# Patient Record
Sex: MALE | Race: ASIAN | NOT HISPANIC OR LATINO | Employment: UNEMPLOYED | URBAN - METROPOLITAN AREA
[De-identification: names, ages, dates, MRNs, and addresses within clinical notes are randomized per-mention and may not be internally consistent; named-entity substitution may affect disease eponyms.]

---

## 2019-08-07 ENCOUNTER — TRANSCRIBE ORDERS (OUTPATIENT)
Dept: ADMINISTRATIVE | Facility: HOSPITAL | Age: 59
End: 2019-08-07

## 2019-08-07 DIAGNOSIS — R29.898 COMPLAINTS OF WEAKNESS OF LOWER EXTREMITY: Primary | ICD-10-CM

## 2019-09-18 ENCOUNTER — HOSPITAL ENCOUNTER (OUTPATIENT)
Dept: INFUSION THERAPY | Facility: HOSPITAL | Age: 59
Discharge: HOME | End: 2019-09-18
Admitting: PSYCHIATRY & NEUROLOGY

## 2019-09-18 DIAGNOSIS — R29.898 COMPLAINTS OF WEAKNESS OF LOWER EXTREMITY: ICD-10-CM

## 2019-09-18 PROCEDURE — 95886 MUSC TEST DONE W/N TEST COMP: CPT

## 2019-09-18 PROCEDURE — 95909 NRV CNDJ TST 5-6 STUDIES: CPT

## 2019-09-18 PROCEDURE — 95909 NRV CNDJ TST 5-6 STUDIES: CPT | Performed by: PSYCHIATRY & NEUROLOGY

## 2019-09-18 PROCEDURE — 95886 MUSC TEST DONE W/N TEST COMP: CPT | Performed by: PSYCHIATRY & NEUROLOGY

## 2019-09-18 NOTE — PROCEDURES
EMG and Nerve Conduction Studies    I.      Instrument used: Neuromax 1002  II.     Please see data sheets for tabular summary of NCS and details on methods, temperatures and lab standards.   III.    EMG muscles tested for upper extremity studies include the deltoid, biceps, triceps, pronator teres, extensor digitorum communis, first dorsal interosseous and abductor pollicis brevis.    IV.   EMG muscles tested for lower extremity studies include the vastus lateralis, tibialis anterior, peroneus longus, medial gastrocnemius and extensor digitorum brevis.    V.    Additional muscles tested as needed.  Paraspinal muscles tested as needed.   VI.   Please see data sheets for tabular summary of EMG findings.   VII. The complete report includes the data sheets.      Indication: Numbness tingling burning in the feet  History: 59-year-old male from [facility] who has a 10-year history of diabetes and approximate 1 year history of numbness tingling and burning in the feet which has progressively escalated of the foot into the lower leg.  No back pain.  The patient speaks no English and communication was excellent through an  who was present throughout the entire exam.      Ht: Not reported  Wt: Not reported  HbA1C: No results found for: HGBA1C  TSH: No results found for: TSH    Technical summary:  Nerve conduction studies were obtained in the left leg with 1 comparison on the right.  Skin temperatures were at least 32 °C measured at the ankles.  Needle examination was obtained on selected muscles in both legs.    Results:  1.  Prolonged left sural sensory latency at 4.2 ms with low amplitude of 4.3 µV.  2.  Normal left superficial peroneal sensory latency with low amplitude of 2.7 µV.  Prolonged right superficial peroneal sensory latency at 4.8 ms with low amplitude of 3.0 µV.  3.  Slow left peroneal motor velocity below the knee at 34.3 m/s with normal velocity in the short segment across the fibular head.  Normal  distal latency and amplitudes.  4.  Slow left tibial motor velocity at 37.5 m/s with a normal distal latency and amplitudes.  5.  Needle examination of selected muscles in both legs showed normal insertional activities throughout.  There was a mild increased number of large motor units in both extensor digitorum brevis muscles with normal firing rates and recruitment.  All other muscles tested in both legs showed normal insertional activities, motor units and recruitment patterns.    Impression:  Abnormal study showing mild peripheral neuropathy.  No additional findings to suggest a lumbosacral radiculopathy on either side by this study.  Needle exam changes were consistent with the nerve conduction findings.  Study results were discussed with the patient through the patient's .    Roberto Carlos Ledezma M.D.              Dictated utilizing Dragon dictation.

## 2021-06-10 ENCOUNTER — OFFICE (OUTPATIENT)
Dept: URBAN - METROPOLITAN AREA CLINIC 75 | Facility: CLINIC | Age: 61
End: 2021-06-10
Payer: MEDICAID

## 2021-06-10 VITALS
DIASTOLIC BLOOD PRESSURE: 80 MMHG | WEIGHT: 159 LBS | OXYGEN SATURATION: 99 % | SYSTOLIC BLOOD PRESSURE: 120 MMHG | HEIGHT: 68 IN | HEART RATE: 67 BPM

## 2021-06-10 DIAGNOSIS — R10.12 LEFT UPPER QUADRANT PAIN: ICD-10-CM

## 2021-06-10 DIAGNOSIS — R10.13 EPIGASTRIC PAIN: ICD-10-CM

## 2021-06-10 PROCEDURE — 99204 OFFICE O/P NEW MOD 45 MIN: CPT | Performed by: INTERNAL MEDICINE

## 2021-06-10 RX ORDER — OMEPRAZOLE 40 MG/1
40 CAPSULE, DELAYED RELEASE ORAL
Qty: 90 | Refills: 3 | Status: ACTIVE
Start: 2021-06-10

## 2021-07-06 ENCOUNTER — OFFICE (OUTPATIENT)
Dept: URBAN - METROPOLITAN AREA CLINIC 75 | Facility: CLINIC | Age: 61
End: 2021-07-06
Payer: MEDICAID

## 2021-07-06 VITALS — WEIGHT: 158 LBS | HEIGHT: 68 IN | DIASTOLIC BLOOD PRESSURE: 83 MMHG | SYSTOLIC BLOOD PRESSURE: 119 MMHG

## 2021-07-06 DIAGNOSIS — R10.12 LEFT UPPER QUADRANT PAIN: ICD-10-CM

## 2021-07-06 PROCEDURE — 99214 OFFICE O/P EST MOD 30 MIN: CPT | Performed by: NURSE PRACTITIONER

## 2021-08-04 ENCOUNTER — ON CAMPUS - OUTPATIENT (OUTPATIENT)
Dept: URBAN - METROPOLITAN AREA HOSPITAL 108 | Facility: HOSPITAL | Age: 61
End: 2021-08-04
Payer: MEDICAID

## 2021-08-04 DIAGNOSIS — K31.89 OTHER DISEASES OF STOMACH AND DUODENUM: ICD-10-CM

## 2021-08-04 DIAGNOSIS — B96.81 HELICOBACTER PYLORI [H. PYLORI] AS THE CAUSE OF DISEASES CLA: ICD-10-CM

## 2021-08-04 DIAGNOSIS — R10.12 LEFT UPPER QUADRANT PAIN: ICD-10-CM

## 2021-08-04 DIAGNOSIS — K29.50 UNSPECIFIED CHRONIC GASTRITIS WITHOUT BLEEDING: ICD-10-CM

## 2021-08-04 PROCEDURE — 43239 EGD BIOPSY SINGLE/MULTIPLE: CPT | Performed by: INTERNAL MEDICINE

## 2022-03-26 ENCOUNTER — APPOINTMENT (OUTPATIENT)
Dept: GENERAL RADIOLOGY | Facility: HOSPITAL | Age: 62
End: 2022-03-26

## 2022-03-26 ENCOUNTER — HOSPITAL ENCOUNTER (INPATIENT)
Facility: HOSPITAL | Age: 62
LOS: 8 days | Discharge: HOME | End: 2022-04-03
Attending: EMERGENCY MEDICINE | Admitting: STUDENT IN AN ORGANIZED HEALTH CARE EDUCATION/TRAINING PROGRAM

## 2022-03-26 DIAGNOSIS — E11.69 TYPE 2 DIABETES MELLITUS WITH OTHER SPECIFIED COMPLICATION, WITHOUT LONG-TERM CURRENT USE OF INSULIN: ICD-10-CM

## 2022-03-26 DIAGNOSIS — J18.9 COMMUNITY ACQUIRED PNEUMONIA, UNSPECIFIED LATERALITY: Primary | ICD-10-CM

## 2022-03-26 PROBLEM — E11.9 DM (DIABETES MELLITUS), TYPE 2 (HCC): Status: ACTIVE | Noted: 2022-03-26

## 2022-03-26 PROBLEM — D69.6 THROMBOCYTOPENIA (HCC): Status: ACTIVE | Noted: 2022-03-26

## 2022-03-26 PROBLEM — K21.9 GERD WITHOUT ESOPHAGITIS: Status: ACTIVE | Noted: 2022-03-26

## 2022-03-26 PROBLEM — I10 HTN (HYPERTENSION): Status: ACTIVE | Noted: 2022-03-26

## 2022-03-26 LAB
ALBUMIN SERPL-MCNC: 3.6 G/DL (ref 3.5–5.2)
ALBUMIN/GLOB SERPL: 1.2 G/DL
ALP SERPL-CCNC: 121 U/L (ref 39–117)
ALT SERPL W P-5'-P-CCNC: 22 U/L (ref 1–41)
ANION GAP SERPL CALCULATED.3IONS-SCNC: 14.8 MMOL/L (ref 5–15)
AST SERPL-CCNC: 18 U/L (ref 1–40)
B PARAPERT DNA SPEC QL NAA+PROBE: NOT DETECTED
B PERT DNA SPEC QL NAA+PROBE: NOT DETECTED
BASOPHILS # BLD AUTO: 0.03 10*3/MM3 (ref 0–0.2)
BASOPHILS NFR BLD AUTO: 0.2 % (ref 0–1.5)
BILIRUB SERPL-MCNC: 1.1 MG/DL (ref 0–1.2)
BUN SERPL-MCNC: 13 MG/DL (ref 8–23)
BUN/CREAT SERPL: 14 (ref 7–25)
C PNEUM DNA NPH QL NAA+NON-PROBE: NOT DETECTED
CALCIUM SPEC-SCNC: 8.3 MG/DL (ref 8.6–10.5)
CHLORIDE SERPL-SCNC: 105 MMOL/L (ref 98–107)
CO2 SERPL-SCNC: 18.2 MMOL/L (ref 22–29)
CREAT SERPL-MCNC: 0.93 MG/DL (ref 0.76–1.27)
D-LACTATE SERPL-SCNC: 0.9 MMOL/L (ref 0.5–2)
DEPRECATED RDW RBC AUTO: 42.2 FL (ref 37–54)
EGFRCR SERPLBLD CKD-EPI 2021: 93.4 ML/MIN/1.73
EOSINOPHIL # BLD AUTO: 0.01 10*3/MM3 (ref 0–0.4)
EOSINOPHIL NFR BLD AUTO: 0.1 % (ref 0.3–6.2)
ERYTHROCYTE [DISTWIDTH] IN BLOOD BY AUTOMATED COUNT: 13.1 % (ref 12.3–15.4)
FLUAV SUBTYP SPEC NAA+PROBE: NOT DETECTED
FLUBV RNA ISLT QL NAA+PROBE: NOT DETECTED
GLOBULIN UR ELPH-MCNC: 2.9 GM/DL
GLUCOSE BLDC GLUCOMTR-MCNC: 180 MG/DL (ref 70–130)
GLUCOSE BLDC GLUCOMTR-MCNC: 184 MG/DL (ref 70–130)
GLUCOSE BLDC GLUCOMTR-MCNC: 255 MG/DL (ref 70–130)
GLUCOSE SERPL-MCNC: 148 MG/DL (ref 65–99)
HADV DNA SPEC NAA+PROBE: NOT DETECTED
HBA1C MFR BLD: 8.8 % (ref 4.8–5.6)
HCOV 229E RNA SPEC QL NAA+PROBE: NOT DETECTED
HCOV HKU1 RNA SPEC QL NAA+PROBE: NOT DETECTED
HCOV NL63 RNA SPEC QL NAA+PROBE: NOT DETECTED
HCOV OC43 RNA SPEC QL NAA+PROBE: NOT DETECTED
HCT VFR BLD AUTO: 41.2 % (ref 37.5–51)
HGB BLD-MCNC: 13.5 G/DL (ref 13–17.7)
HMPV RNA NPH QL NAA+NON-PROBE: NOT DETECTED
HPIV1 RNA ISLT QL NAA+PROBE: NOT DETECTED
HPIV2 RNA SPEC QL NAA+PROBE: NOT DETECTED
HPIV3 RNA NPH QL NAA+PROBE: NOT DETECTED
HPIV4 P GENE NPH QL NAA+PROBE: NOT DETECTED
IMM GRANULOCYTES # BLD AUTO: 0.38 10*3/MM3 (ref 0–0.05)
IMM GRANULOCYTES NFR BLD AUTO: 2.2 % (ref 0–0.5)
L PNEUMO1 AG UR QL IA: NEGATIVE
LYMPHOCYTES # BLD AUTO: 0.75 10*3/MM3 (ref 0.7–3.1)
LYMPHOCYTES NFR BLD AUTO: 4.2 % (ref 19.6–45.3)
M PNEUMO IGG SER IA-ACNC: NOT DETECTED
MCH RBC QN AUTO: 28.7 PG (ref 26.6–33)
MCHC RBC AUTO-ENTMCNC: 32.8 G/DL (ref 31.5–35.7)
MCV RBC AUTO: 87.5 FL (ref 79–97)
MONOCYTES # BLD AUTO: 1.54 10*3/MM3 (ref 0.1–0.9)
MONOCYTES NFR BLD AUTO: 8.7 % (ref 5–12)
MRSA DNA SPEC QL NAA+PROBE: NORMAL
NEUTROPHILS NFR BLD AUTO: 14.94 10*3/MM3 (ref 1.7–7)
NEUTROPHILS NFR BLD AUTO: 84.6 % (ref 42.7–76)
NRBC BLD AUTO-RTO: 0 /100 WBC (ref 0–0.2)
PLATELET # BLD AUTO: 129 10*3/MM3 (ref 140–450)
PMV BLD AUTO: 10.7 FL (ref 6–12)
POTASSIUM SERPL-SCNC: 3.5 MMOL/L (ref 3.5–5.2)
PROCALCITONIN SERPL-MCNC: 0.87 NG/ML (ref 0–0.25)
PROT SERPL-MCNC: 6.5 G/DL (ref 6–8.5)
QT INTERVAL: 343 MS
RBC # BLD AUTO: 4.71 10*6/MM3 (ref 4.14–5.8)
RHINOVIRUS RNA SPEC NAA+PROBE: NOT DETECTED
RSV RNA NPH QL NAA+NON-PROBE: NOT DETECTED
S PNEUM AG SPEC QL LA: NEGATIVE
SARS-COV-2 ORF1AB RESP QL NAA+PROBE: NOT DETECTED
SARS-COV-2 RNA NPH QL NAA+NON-PROBE: NOT DETECTED
SODIUM SERPL-SCNC: 138 MMOL/L (ref 136–145)
WBC NRBC COR # BLD: 17.65 10*3/MM3 (ref 3.4–10.8)

## 2022-03-26 PROCEDURE — 83605 ASSAY OF LACTIC ACID: CPT | Performed by: PHYSICIAN ASSISTANT

## 2022-03-26 PROCEDURE — 0202U NFCT DS 22 TRGT SARS-COV-2: CPT | Performed by: STUDENT IN AN ORGANIZED HEALTH CARE EDUCATION/TRAINING PROGRAM

## 2022-03-26 PROCEDURE — 87641 MR-STAPH DNA AMP PROBE: CPT | Performed by: NURSE PRACTITIONER

## 2022-03-26 PROCEDURE — 99285 EMERGENCY DEPT VISIT HI MDM: CPT

## 2022-03-26 PROCEDURE — 84145 PROCALCITONIN (PCT): CPT | Performed by: PHYSICIAN ASSISTANT

## 2022-03-26 PROCEDURE — 87899 AGENT NOS ASSAY W/OPTIC: CPT | Performed by: NURSE PRACTITIONER

## 2022-03-26 PROCEDURE — 82962 GLUCOSE BLOOD TEST: CPT

## 2022-03-26 PROCEDURE — 94799 UNLISTED PULMONARY SVC/PX: CPT

## 2022-03-26 PROCEDURE — 25010000002 AZITHROMYCIN PER 500 MG: Performed by: PHYSICIAN ASSISTANT

## 2022-03-26 PROCEDURE — 80053 COMPREHEN METABOLIC PANEL: CPT | Performed by: PHYSICIAN ASSISTANT

## 2022-03-26 PROCEDURE — 94640 AIRWAY INHALATION TREATMENT: CPT

## 2022-03-26 PROCEDURE — 93005 ELECTROCARDIOGRAM TRACING: CPT

## 2022-03-26 PROCEDURE — 85025 COMPLETE CBC W/AUTO DIFF WBC: CPT | Performed by: PHYSICIAN ASSISTANT

## 2022-03-26 PROCEDURE — 71045 X-RAY EXAM CHEST 1 VIEW: CPT

## 2022-03-26 PROCEDURE — 93005 ELECTROCARDIOGRAM TRACING: CPT | Performed by: EMERGENCY MEDICINE

## 2022-03-26 PROCEDURE — 87040 BLOOD CULTURE FOR BACTERIA: CPT | Performed by: PHYSICIAN ASSISTANT

## 2022-03-26 PROCEDURE — 83036 HEMOGLOBIN GLYCOSYLATED A1C: CPT | Performed by: NURSE PRACTITIONER

## 2022-03-26 PROCEDURE — 25010000002 CEFTRIAXONE PER 250 MG: Performed by: PHYSICIAN ASSISTANT

## 2022-03-26 PROCEDURE — U0004 COV-19 TEST NON-CDC HGH THRU: HCPCS | Performed by: STUDENT IN AN ORGANIZED HEALTH CARE EDUCATION/TRAINING PROGRAM

## 2022-03-26 PROCEDURE — 93010 ELECTROCARDIOGRAM REPORT: CPT | Performed by: INTERNAL MEDICINE

## 2022-03-26 RX ORDER — ONDANSETRON 2 MG/ML
4 INJECTION INTRAMUSCULAR; INTRAVENOUS EVERY 6 HOURS PRN
Status: DISCONTINUED | OUTPATIENT
Start: 2022-03-26 | End: 2022-04-03 | Stop reason: HOSPADM

## 2022-03-26 RX ORDER — CETIRIZINE HYDROCHLORIDE 10 MG/1
10 TABLET ORAL DAILY
Status: DISCONTINUED | OUTPATIENT
Start: 2022-03-26 | End: 2022-04-03 | Stop reason: HOSPADM

## 2022-03-26 RX ORDER — BENZONATATE 100 MG/1
100 CAPSULE ORAL 3 TIMES DAILY PRN
Status: DISCONTINUED | OUTPATIENT
Start: 2022-03-26 | End: 2022-04-03 | Stop reason: HOSPADM

## 2022-03-26 RX ORDER — NITROGLYCERIN 0.4 MG/1
0.4 TABLET SUBLINGUAL
Status: DISCONTINUED | OUTPATIENT
Start: 2022-03-26 | End: 2022-04-03 | Stop reason: HOSPADM

## 2022-03-26 RX ORDER — GLIPIZIDE 10 MG/1
10 TABLET ORAL
COMMUNITY

## 2022-03-26 RX ORDER — IPRATROPIUM BROMIDE AND ALBUTEROL SULFATE 2.5; .5 MG/3ML; MG/3ML
3 SOLUTION RESPIRATORY (INHALATION)
Status: DISCONTINUED | OUTPATIENT
Start: 2022-03-26 | End: 2022-04-03

## 2022-03-26 RX ORDER — ACETAMINOPHEN 500 MG
1000 TABLET ORAL ONCE
Status: COMPLETED | OUTPATIENT
Start: 2022-03-26 | End: 2022-03-26

## 2022-03-26 RX ORDER — ACETAMINOPHEN 160 MG/5ML
650 SOLUTION ORAL EVERY 4 HOURS PRN
Status: DISCONTINUED | OUTPATIENT
Start: 2022-03-26 | End: 2022-04-03 | Stop reason: HOSPADM

## 2022-03-26 RX ORDER — SODIUM CHLORIDE 9 MG/ML
150 INJECTION, SOLUTION INTRAVENOUS CONTINUOUS
Status: DISCONTINUED | OUTPATIENT
Start: 2022-03-26 | End: 2022-03-28

## 2022-03-26 RX ORDER — ONDANSETRON 4 MG/1
4 TABLET, FILM COATED ORAL EVERY 6 HOURS PRN
Status: DISCONTINUED | OUTPATIENT
Start: 2022-03-26 | End: 2022-04-03 | Stop reason: HOSPADM

## 2022-03-26 RX ORDER — LEVOFLOXACIN 750 MG/1
750 TABLET ORAL DAILY
COMMUNITY
End: 2022-04-03 | Stop reason: HOSPADM

## 2022-03-26 RX ORDER — ACETAMINOPHEN 325 MG/1
650 TABLET ORAL EVERY 6 HOURS PRN
COMMUNITY

## 2022-03-26 RX ORDER — DEXTROSE MONOHYDRATE 25 G/50ML
25 INJECTION, SOLUTION INTRAVENOUS
Status: DISCONTINUED | OUTPATIENT
Start: 2022-03-26 | End: 2022-04-03 | Stop reason: HOSPADM

## 2022-03-26 RX ORDER — PREGABALIN 100 MG/1
300 CAPSULE ORAL 2 TIMES DAILY
Status: DISCONTINUED | OUTPATIENT
Start: 2022-03-26 | End: 2022-04-03 | Stop reason: HOSPADM

## 2022-03-26 RX ORDER — NICOTINE POLACRILEX 4 MG
15 LOZENGE BUCCAL
Status: DISCONTINUED | OUTPATIENT
Start: 2022-03-26 | End: 2022-04-03 | Stop reason: HOSPADM

## 2022-03-26 RX ORDER — ACETAMINOPHEN 650 MG/1
650 SUPPOSITORY RECTAL EVERY 4 HOURS PRN
Status: DISCONTINUED | OUTPATIENT
Start: 2022-03-26 | End: 2022-04-03 | Stop reason: HOSPADM

## 2022-03-26 RX ORDER — BENZONATATE 100 MG/1
100 CAPSULE ORAL 3 TIMES DAILY PRN
Status: ON HOLD | COMMUNITY
End: 2022-04-03 | Stop reason: SDUPTHER

## 2022-03-26 RX ORDER — SODIUM CHLORIDE 0.9 % (FLUSH) 0.9 %
10 SYRINGE (ML) INJECTION AS NEEDED
Status: DISCONTINUED | OUTPATIENT
Start: 2022-03-26 | End: 2022-04-03 | Stop reason: HOSPADM

## 2022-03-26 RX ORDER — INSULIN LISPRO 100 [IU]/ML
0-9 INJECTION, SOLUTION INTRAVENOUS; SUBCUTANEOUS
Status: DISCONTINUED | OUTPATIENT
Start: 2022-03-26 | End: 2022-04-01

## 2022-03-26 RX ORDER — PREGABALIN 100 MG/1
300 CAPSULE ORAL 2 TIMES DAILY
COMMUNITY

## 2022-03-26 RX ORDER — ACETAMINOPHEN 325 MG/1
650 TABLET ORAL EVERY 4 HOURS PRN
Status: DISCONTINUED | OUTPATIENT
Start: 2022-03-26 | End: 2022-04-03 | Stop reason: HOSPADM

## 2022-03-26 RX ORDER — SODIUM CHLORIDE 0.9 % (FLUSH) 0.9 %
10 SYRINGE (ML) INJECTION EVERY 12 HOURS SCHEDULED
Status: DISCONTINUED | OUTPATIENT
Start: 2022-03-26 | End: 2022-04-03 | Stop reason: HOSPADM

## 2022-03-26 RX ORDER — GLIPIZIDE 10 MG/1
10 TABLET ORAL
Status: DISCONTINUED | OUTPATIENT
Start: 2022-03-27 | End: 2022-03-27

## 2022-03-26 RX ORDER — CETIRIZINE HYDROCHLORIDE 10 MG/1
10 TABLET ORAL DAILY
COMMUNITY

## 2022-03-26 RX ADMIN — IPRATROPIUM BROMIDE AND ALBUTEROL SULFATE 3 ML: 2.5; .5 SOLUTION RESPIRATORY (INHALATION) at 15:42

## 2022-03-26 RX ADMIN — SODIUM CHLORIDE 1000 ML: 9 INJECTION, SOLUTION INTRAVENOUS at 07:35

## 2022-03-26 RX ADMIN — Medication 10 ML: at 12:35

## 2022-03-26 RX ADMIN — Medication 10 ML: at 21:27

## 2022-03-26 RX ADMIN — CEFTRIAXONE 1 G: 1 INJECTION, POWDER, FOR SOLUTION INTRAMUSCULAR; INTRAVENOUS at 09:02

## 2022-03-26 RX ADMIN — AZITHROMYCIN MONOHYDRATE 500 MG: 500 INJECTION, POWDER, LYOPHILIZED, FOR SOLUTION INTRAVENOUS at 09:42

## 2022-03-26 RX ADMIN — SODIUM CHLORIDE 100 ML/HR: 9 INJECTION, SOLUTION INTRAVENOUS at 22:35

## 2022-03-26 RX ADMIN — ACETAMINOPHEN 1000 MG: 500 TABLET ORAL at 07:37

## 2022-03-26 RX ADMIN — SODIUM CHLORIDE 100 ML/HR: 9 INJECTION, SOLUTION INTRAVENOUS at 13:02

## 2022-03-26 RX ADMIN — PREGABALIN 300 MG: 100 CAPSULE ORAL at 21:26

## 2022-03-26 RX ADMIN — BENZONATATE 100 MG: 100 CAPSULE ORAL at 19:03

## 2022-03-26 RX ADMIN — EMPAGLIFLOZIN 25 MG: 25 TABLET, FILM COATED ORAL at 15:28

## 2022-03-26 RX ADMIN — ACETAMINOPHEN 650 MG: 325 TABLET, FILM COATED ORAL at 19:03

## 2022-03-27 PROBLEM — R50.9 FEVER: Status: ACTIVE | Noted: 2022-03-27

## 2022-03-27 LAB
ALBUMIN SERPL-MCNC: 3.2 G/DL (ref 3.5–5.2)
ALBUMIN/GLOB SERPL: 1.3 G/DL
ALP SERPL-CCNC: 120 U/L (ref 39–117)
ALT SERPL W P-5'-P-CCNC: 19 U/L (ref 1–41)
ANION GAP SERPL CALCULATED.3IONS-SCNC: 11 MMOL/L (ref 5–15)
AST SERPL-CCNC: 15 U/L (ref 1–40)
BILIRUB SERPL-MCNC: 0.5 MG/DL (ref 0–1.2)
BUN SERPL-MCNC: 13 MG/DL (ref 8–23)
BUN/CREAT SERPL: 13.4 (ref 7–25)
CALCIUM SPEC-SCNC: 7.6 MG/DL (ref 8.6–10.5)
CHLORIDE SERPL-SCNC: 111 MMOL/L (ref 98–107)
CO2 SERPL-SCNC: 18 MMOL/L (ref 22–29)
CREAT SERPL-MCNC: 0.97 MG/DL (ref 0.76–1.27)
D-LACTATE SERPL-SCNC: 1.1 MMOL/L (ref 0.5–2)
DEPRECATED RDW RBC AUTO: 41.8 FL (ref 37–54)
EGFRCR SERPLBLD CKD-EPI 2021: 88.8 ML/MIN/1.73
ERYTHROCYTE [DISTWIDTH] IN BLOOD BY AUTOMATED COUNT: 13.7 % (ref 12.3–15.4)
GLOBULIN UR ELPH-MCNC: 2.4 GM/DL
GLUCOSE BLDC GLUCOMTR-MCNC: 116 MG/DL (ref 70–130)
GLUCOSE BLDC GLUCOMTR-MCNC: 178 MG/DL (ref 70–130)
GLUCOSE BLDC GLUCOMTR-MCNC: 97 MG/DL (ref 70–130)
GLUCOSE SERPL-MCNC: 105 MG/DL (ref 65–99)
HCT VFR BLD AUTO: 42.7 % (ref 37.5–51)
HGB BLD-MCNC: 14.9 G/DL (ref 13–17.7)
LYMPHOCYTES # BLD MANUAL: 2.22 10*3/MM3 (ref 0.7–3.1)
LYMPHOCYTES NFR BLD MANUAL: 10 % (ref 5–12)
MAGNESIUM SERPL-MCNC: 1.9 MG/DL (ref 1.6–2.4)
MCH RBC QN AUTO: 29.2 PG (ref 26.6–33)
MCHC RBC AUTO-ENTMCNC: 34.9 G/DL (ref 31.5–35.7)
MCV RBC AUTO: 83.6 FL (ref 79–97)
MONOCYTES # BLD: 1.48 10*3/MM3 (ref 0.1–0.9)
NEUTROPHILS # BLD AUTO: 11.12 10*3/MM3 (ref 1.7–7)
NEUTROPHILS NFR BLD MANUAL: 75 % (ref 42.7–76)
NRBC BLD AUTO-RTO: 0.1 /100 WBC (ref 0–0.2)
PHOSPHATE SERPL-MCNC: 2.3 MG/DL (ref 2.5–4.5)
PLAT MORPH BLD: NORMAL
PLATELET # BLD AUTO: 132 10*3/MM3 (ref 140–450)
PMV BLD AUTO: 11.9 FL (ref 6–12)
POTASSIUM SERPL-SCNC: 3.4 MMOL/L (ref 3.5–5.2)
POTASSIUM SERPL-SCNC: 4.1 MMOL/L (ref 3.5–5.2)
PROT SERPL-MCNC: 5.6 G/DL (ref 6–8.5)
RBC # BLD AUTO: 5.11 10*6/MM3 (ref 4.14–5.8)
RBC MORPH BLD: NORMAL
SODIUM SERPL-SCNC: 140 MMOL/L (ref 136–145)
VARIANT LYMPHS NFR BLD MANUAL: 15 % (ref 19.6–45.3)
WBC MORPH BLD: NORMAL
WBC NRBC COR # BLD: 14.83 10*3/MM3 (ref 3.4–10.8)

## 2022-03-27 PROCEDURE — 94799 UNLISTED PULMONARY SVC/PX: CPT

## 2022-03-27 PROCEDURE — 87103 BLOOD FUNGUS CULTURE: CPT | Performed by: STUDENT IN AN ORGANIZED HEALTH CARE EDUCATION/TRAINING PROGRAM

## 2022-03-27 PROCEDURE — 85007 BL SMEAR W/DIFF WBC COUNT: CPT | Performed by: STUDENT IN AN ORGANIZED HEALTH CARE EDUCATION/TRAINING PROGRAM

## 2022-03-27 PROCEDURE — 84100 ASSAY OF PHOSPHORUS: CPT | Performed by: STUDENT IN AN ORGANIZED HEALTH CARE EDUCATION/TRAINING PROGRAM

## 2022-03-27 PROCEDURE — 94761 N-INVAS EAR/PLS OXIMETRY MLT: CPT

## 2022-03-27 PROCEDURE — 80053 COMPREHEN METABOLIC PANEL: CPT | Performed by: STUDENT IN AN ORGANIZED HEALTH CARE EDUCATION/TRAINING PROGRAM

## 2022-03-27 PROCEDURE — 25010000002 CEFEPIME PER 500 MG: Performed by: STUDENT IN AN ORGANIZED HEALTH CARE EDUCATION/TRAINING PROGRAM

## 2022-03-27 PROCEDURE — 82962 GLUCOSE BLOOD TEST: CPT

## 2022-03-27 PROCEDURE — 83605 ASSAY OF LACTIC ACID: CPT | Performed by: STUDENT IN AN ORGANIZED HEALTH CARE EDUCATION/TRAINING PROGRAM

## 2022-03-27 PROCEDURE — 0 MAGNESIUM SULFATE 4 GM/100ML SOLUTION: Performed by: STUDENT IN AN ORGANIZED HEALTH CARE EDUCATION/TRAINING PROGRAM

## 2022-03-27 PROCEDURE — 83735 ASSAY OF MAGNESIUM: CPT | Performed by: STUDENT IN AN ORGANIZED HEALTH CARE EDUCATION/TRAINING PROGRAM

## 2022-03-27 PROCEDURE — 85025 COMPLETE CBC W/AUTO DIFF WBC: CPT | Performed by: STUDENT IN AN ORGANIZED HEALTH CARE EDUCATION/TRAINING PROGRAM

## 2022-03-27 PROCEDURE — 94664 DEMO&/EVAL PT USE INHALER: CPT

## 2022-03-27 PROCEDURE — 84132 ASSAY OF SERUM POTASSIUM: CPT | Performed by: STUDENT IN AN ORGANIZED HEALTH CARE EDUCATION/TRAINING PROGRAM

## 2022-03-27 RX ORDER — MAGNESIUM SULFATE HEPTAHYDRATE 40 MG/ML
2 INJECTION, SOLUTION INTRAVENOUS AS NEEDED
Status: DISCONTINUED | OUTPATIENT
Start: 2022-03-27 | End: 2022-04-03 | Stop reason: HOSPADM

## 2022-03-27 RX ORDER — POTASSIUM CHLORIDE 1.5 G/1.77G
40 POWDER, FOR SOLUTION ORAL AS NEEDED
Status: DISCONTINUED | OUTPATIENT
Start: 2022-03-27 | End: 2022-04-03 | Stop reason: HOSPADM

## 2022-03-27 RX ORDER — MAGNESIUM SULFATE HEPTAHYDRATE 40 MG/ML
4 INJECTION, SOLUTION INTRAVENOUS AS NEEDED
Status: DISCONTINUED | OUTPATIENT
Start: 2022-03-27 | End: 2022-04-03 | Stop reason: HOSPADM

## 2022-03-27 RX ORDER — ACETAMINOPHEN 500 MG
1000 TABLET ORAL ONCE
Status: COMPLETED | OUTPATIENT
Start: 2022-03-27 | End: 2022-03-27

## 2022-03-27 RX ORDER — POTASSIUM CHLORIDE 750 MG/1
40 TABLET, FILM COATED, EXTENDED RELEASE ORAL AS NEEDED
Status: DISCONTINUED | OUTPATIENT
Start: 2022-03-27 | End: 2022-04-03 | Stop reason: HOSPADM

## 2022-03-27 RX ORDER — GUAIFENESIN AND DEXTROMETHORPHAN HYDROBROMIDE 600; 30 MG/1; MG/1
2 TABLET, EXTENDED RELEASE ORAL 2 TIMES DAILY PRN
Status: DISCONTINUED | OUTPATIENT
Start: 2022-03-27 | End: 2022-04-03 | Stop reason: HOSPADM

## 2022-03-27 RX ADMIN — POTASSIUM CHLORIDE 40 MEQ: 10 TABLET, EXTENDED RELEASE ORAL at 13:20

## 2022-03-27 RX ADMIN — POTASSIUM CHLORIDE 40 MEQ: 10 TABLET, EXTENDED RELEASE ORAL at 17:31

## 2022-03-27 RX ADMIN — EMPAGLIFLOZIN 25 MG: 25 TABLET, FILM COATED ORAL at 08:34

## 2022-03-27 RX ADMIN — PREGABALIN 300 MG: 100 CAPSULE ORAL at 08:33

## 2022-03-27 RX ADMIN — MAGNESIUM SULFATE HEPTAHYDRATE 4 G: 40 INJECTION, SOLUTION INTRAVENOUS at 15:40

## 2022-03-27 RX ADMIN — ACETAMINOPHEN 1000 MG: 500 TABLET ORAL at 06:23

## 2022-03-27 RX ADMIN — IPRATROPIUM BROMIDE AND ALBUTEROL SULFATE 3 ML: 2.5; .5 SOLUTION RESPIRATORY (INHALATION) at 20:26

## 2022-03-27 RX ADMIN — IPRATROPIUM BROMIDE AND ALBUTEROL SULFATE 3 ML: 2.5; .5 SOLUTION RESPIRATORY (INHALATION) at 07:34

## 2022-03-27 RX ADMIN — ACETAMINOPHEN 650 MG: 325 TABLET, FILM COATED ORAL at 14:42

## 2022-03-27 RX ADMIN — CETIRIZINE HYDROCHLORIDE 10 MG: 10 TABLET ORAL at 08:33

## 2022-03-27 RX ADMIN — ACETAMINOPHEN 650 MG: 325 TABLET, FILM COATED ORAL at 20:31

## 2022-03-27 RX ADMIN — POTASSIUM PHOSPHATE, MONOBASIC AND POTASSIUM PHOSPHATE, DIBASIC 15 MMOL: 224; 236 INJECTION, SOLUTION, CONCENTRATE INTRAVENOUS at 18:01

## 2022-03-27 RX ADMIN — SODIUM CHLORIDE 150 ML/HR: 9 INJECTION, SOLUTION INTRAVENOUS at 23:23

## 2022-03-27 RX ADMIN — PREGABALIN 300 MG: 100 CAPSULE ORAL at 20:31

## 2022-03-27 RX ADMIN — Medication 10 ML: at 20:35

## 2022-03-27 RX ADMIN — ACETAMINOPHEN 650 MG: 325 TABLET, FILM COATED ORAL at 10:28

## 2022-03-27 RX ADMIN — CEFEPIME HYDROCHLORIDE 2 G: 2 INJECTION, POWDER, FOR SOLUTION INTRAVENOUS at 17:32

## 2022-03-27 RX ADMIN — GLIPIZIDE 10 MG: 10 TABLET ORAL at 08:33

## 2022-03-27 RX ADMIN — SODIUM CHLORIDE 100 ML/HR: 9 INJECTION, SOLUTION INTRAVENOUS at 08:32

## 2022-03-27 RX ADMIN — ACETAMINOPHEN 650 MG: 325 TABLET, FILM COATED ORAL at 01:35

## 2022-03-27 RX ADMIN — IPRATROPIUM BROMIDE AND ALBUTEROL SULFATE 3 ML: 2.5; .5 SOLUTION RESPIRATORY (INHALATION) at 11:26

## 2022-03-27 RX ADMIN — IPRATROPIUM BROMIDE AND ALBUTEROL SULFATE 3 ML: 2.5; .5 SOLUTION RESPIRATORY (INHALATION) at 15:50

## 2022-03-27 RX ADMIN — BENZONATATE 100 MG: 100 CAPSULE ORAL at 20:31

## 2022-03-27 RX ADMIN — SODIUM CHLORIDE 1000 ML: 9 INJECTION, SOLUTION INTRAVENOUS at 07:26

## 2022-03-27 RX ADMIN — CEFEPIME HYDROCHLORIDE 2 G: 2 INJECTION, POWDER, FOR SOLUTION INTRAVENOUS at 08:33

## 2022-03-27 RX ADMIN — BENZONATATE 100 MG: 100 CAPSULE ORAL at 13:20

## 2022-03-28 ENCOUNTER — APPOINTMENT (OUTPATIENT)
Dept: GENERAL RADIOLOGY | Facility: HOSPITAL | Age: 62
End: 2022-03-28

## 2022-03-28 ENCOUNTER — APPOINTMENT (OUTPATIENT)
Dept: CT IMAGING | Facility: HOSPITAL | Age: 62
End: 2022-03-28

## 2022-03-28 LAB
ANION GAP SERPL CALCULATED.3IONS-SCNC: 12.9 MMOL/L (ref 5–15)
BASOPHILS # BLD AUTO: 0.03 10*3/MM3 (ref 0–0.2)
BASOPHILS NFR BLD AUTO: 0.3 % (ref 0–1.5)
BUN SERPL-MCNC: 11 MG/DL (ref 8–23)
BUN/CREAT SERPL: 13.8 (ref 7–25)
CALCIUM SPEC-SCNC: 7.6 MG/DL (ref 8.6–10.5)
CHLORIDE SERPL-SCNC: 113 MMOL/L (ref 98–107)
CO2 SERPL-SCNC: 17.1 MMOL/L (ref 22–29)
CREAT SERPL-MCNC: 0.8 MG/DL (ref 0.76–1.27)
D DIMER PPP FEU-MCNC: 2.93 MCGFEU/ML (ref 0–0.49)
DEPRECATED RDW RBC AUTO: 41.4 FL (ref 37–54)
EGFRCR SERPLBLD CKD-EPI 2021: 100.7 ML/MIN/1.73
EOSINOPHIL # BLD AUTO: 0.06 10*3/MM3 (ref 0–0.4)
EOSINOPHIL NFR BLD AUTO: 0.6 % (ref 0.3–6.2)
ERYTHROCYTE [DISTWIDTH] IN BLOOD BY AUTOMATED COUNT: 13.3 % (ref 12.3–15.4)
GLUCOSE BLDC GLUCOMTR-MCNC: 108 MG/DL (ref 70–130)
GLUCOSE BLDC GLUCOMTR-MCNC: 123 MG/DL (ref 70–130)
GLUCOSE BLDC GLUCOMTR-MCNC: 178 MG/DL (ref 70–130)
GLUCOSE BLDC GLUCOMTR-MCNC: 82 MG/DL (ref 70–130)
GLUCOSE BLDC GLUCOMTR-MCNC: 94 MG/DL (ref 70–130)
GLUCOSE SERPL-MCNC: 92 MG/DL (ref 65–99)
HCT VFR BLD AUTO: 34.5 % (ref 37.5–51)
HGB BLD-MCNC: 11.5 G/DL (ref 13–17.7)
HIV1+2 AB SER QL: NORMAL
IMM GRANULOCYTES # BLD AUTO: 0.04 10*3/MM3 (ref 0–0.05)
IMM GRANULOCYTES NFR BLD AUTO: 0.4 % (ref 0–0.5)
LYMPHOCYTES # BLD AUTO: 1.07 10*3/MM3 (ref 0.7–3.1)
LYMPHOCYTES NFR BLD AUTO: 11.2 % (ref 19.6–45.3)
MCH RBC QN AUTO: 28.4 PG (ref 26.6–33)
MCHC RBC AUTO-ENTMCNC: 33.3 G/DL (ref 31.5–35.7)
MCV RBC AUTO: 85.2 FL (ref 79–97)
MONOCYTES # BLD AUTO: 0.95 10*3/MM3 (ref 0.1–0.9)
MONOCYTES NFR BLD AUTO: 9.9 % (ref 5–12)
NEUTROPHILS NFR BLD AUTO: 7.41 10*3/MM3 (ref 1.7–7)
NEUTROPHILS NFR BLD AUTO: 77.6 % (ref 42.7–76)
NRBC BLD AUTO-RTO: 0 /100 WBC (ref 0–0.2)
NT-PROBNP SERPL-MCNC: 2622 PG/ML (ref 0–900)
PHOSPHATE SERPL-MCNC: 2.6 MG/DL (ref 2.5–4.5)
PLATELET # BLD AUTO: 127 10*3/MM3 (ref 140–450)
PMV BLD AUTO: 10.7 FL (ref 6–12)
POTASSIUM SERPL-SCNC: 4.1 MMOL/L (ref 3.5–5.2)
PROCALCITONIN SERPL-MCNC: 3.27 NG/ML (ref 0–0.25)
QT INTERVAL: 347 MS
RBC # BLD AUTO: 4.05 10*6/MM3 (ref 4.14–5.8)
SODIUM SERPL-SCNC: 143 MMOL/L (ref 136–145)
TROPONIN T SERPL-MCNC: <0.01 NG/ML (ref 0–0.03)
WBC NRBC COR # BLD: 9.56 10*3/MM3 (ref 3.4–10.8)

## 2022-03-28 PROCEDURE — 85025 COMPLETE CBC W/AUTO DIFF WBC: CPT | Performed by: STUDENT IN AN ORGANIZED HEALTH CARE EDUCATION/TRAINING PROGRAM

## 2022-03-28 PROCEDURE — 80048 BASIC METABOLIC PNL TOTAL CA: CPT | Performed by: STUDENT IN AN ORGANIZED HEALTH CARE EDUCATION/TRAINING PROGRAM

## 2022-03-28 PROCEDURE — 84484 ASSAY OF TROPONIN QUANT: CPT | Performed by: HOSPITALIST

## 2022-03-28 PROCEDURE — 63710000001 INSULIN LISPRO (HUMAN) PER 5 UNITS: Performed by: NURSE PRACTITIONER

## 2022-03-28 PROCEDURE — G0432 EIA HIV-1/HIV-2 SCREEN: HCPCS | Performed by: HOSPITALIST

## 2022-03-28 PROCEDURE — 93010 ELECTROCARDIOGRAM REPORT: CPT | Performed by: INTERNAL MEDICINE

## 2022-03-28 PROCEDURE — 83880 ASSAY OF NATRIURETIC PEPTIDE: CPT | Performed by: HOSPITALIST

## 2022-03-28 PROCEDURE — 99223 1ST HOSP IP/OBS HIGH 75: CPT | Performed by: INTERNAL MEDICINE

## 2022-03-28 PROCEDURE — 84100 ASSAY OF PHOSPHORUS: CPT | Performed by: STUDENT IN AN ORGANIZED HEALTH CARE EDUCATION/TRAINING PROGRAM

## 2022-03-28 PROCEDURE — 25010000002 ENOXAPARIN PER 10 MG: Performed by: HOSPITALIST

## 2022-03-28 PROCEDURE — 94799 UNLISTED PULMONARY SVC/PX: CPT

## 2022-03-28 PROCEDURE — 92610 EVALUATE SWALLOWING FUNCTION: CPT

## 2022-03-28 PROCEDURE — 0 IOPAMIDOL PER 1 ML: Performed by: HOSPITALIST

## 2022-03-28 PROCEDURE — 94664 DEMO&/EVAL PT USE INHALER: CPT

## 2022-03-28 PROCEDURE — 82962 GLUCOSE BLOOD TEST: CPT

## 2022-03-28 PROCEDURE — 85379 FIBRIN DEGRADATION QUANT: CPT | Performed by: HOSPITALIST

## 2022-03-28 PROCEDURE — 93005 ELECTROCARDIOGRAM TRACING: CPT | Performed by: HOSPITALIST

## 2022-03-28 PROCEDURE — 71045 X-RAY EXAM CHEST 1 VIEW: CPT

## 2022-03-28 PROCEDURE — 84145 PROCALCITONIN (PCT): CPT | Performed by: INTERNAL MEDICINE

## 2022-03-28 PROCEDURE — 71275 CT ANGIOGRAPHY CHEST: CPT

## 2022-03-28 PROCEDURE — 25010000002 CEFEPIME PER 500 MG: Performed by: STUDENT IN AN ORGANIZED HEALTH CARE EDUCATION/TRAINING PROGRAM

## 2022-03-28 PROCEDURE — 74177 CT ABD & PELVIS W/CONTRAST: CPT

## 2022-03-28 RX ORDER — IPRATROPIUM BROMIDE AND ALBUTEROL SULFATE 2.5; .5 MG/3ML; MG/3ML
3 SOLUTION RESPIRATORY (INHALATION)
Status: DISCONTINUED | OUTPATIENT
Start: 2022-03-28 | End: 2022-04-03 | Stop reason: HOSPADM

## 2022-03-28 RX ADMIN — CEFEPIME HYDROCHLORIDE 2 G: 2 INJECTION, POWDER, FOR SOLUTION INTRAVENOUS at 16:55

## 2022-03-28 RX ADMIN — CETIRIZINE HYDROCHLORIDE 10 MG: 10 TABLET ORAL at 09:12

## 2022-03-28 RX ADMIN — CEFEPIME HYDROCHLORIDE 2 G: 2 INJECTION, POWDER, FOR SOLUTION INTRAVENOUS at 09:13

## 2022-03-28 RX ADMIN — IOPAMIDOL 95 ML: 755 INJECTION, SOLUTION INTRAVENOUS at 10:42

## 2022-03-28 RX ADMIN — IPRATROPIUM BROMIDE AND ALBUTEROL SULFATE 3 ML: 2.5; .5 SOLUTION RESPIRATORY (INHALATION) at 15:02

## 2022-03-28 RX ADMIN — BENZONATATE 100 MG: 100 CAPSULE ORAL at 20:28

## 2022-03-28 RX ADMIN — PREGABALIN 300 MG: 100 CAPSULE ORAL at 09:13

## 2022-03-28 RX ADMIN — ACETAMINOPHEN 650 MG: 325 TABLET, FILM COATED ORAL at 20:28

## 2022-03-28 RX ADMIN — ACETAMINOPHEN 650 MG: 325 TABLET, FILM COATED ORAL at 16:55

## 2022-03-28 RX ADMIN — IPRATROPIUM BROMIDE AND ALBUTEROL SULFATE 3 ML: 2.5; .5 SOLUTION RESPIRATORY (INHALATION) at 10:43

## 2022-03-28 RX ADMIN — Medication 10 ML: at 10:58

## 2022-03-28 RX ADMIN — PREGABALIN 300 MG: 100 CAPSULE ORAL at 20:28

## 2022-03-28 RX ADMIN — ACETAMINOPHEN 650 MG: 325 TABLET, FILM COATED ORAL at 12:02

## 2022-03-28 RX ADMIN — SODIUM CHLORIDE 150 ML/HR: 9 INJECTION, SOLUTION INTRAVENOUS at 05:53

## 2022-03-28 RX ADMIN — ACETAMINOPHEN 650 MG: 325 TABLET, FILM COATED ORAL at 06:36

## 2022-03-28 RX ADMIN — IPRATROPIUM BROMIDE AND ALBUTEROL SULFATE 3 ML: 2.5; .5 SOLUTION RESPIRATORY (INHALATION) at 19:58

## 2022-03-28 RX ADMIN — ACETAMINOPHEN 650 MG: 325 TABLET, FILM COATED ORAL at 02:37

## 2022-03-28 RX ADMIN — IPRATROPIUM BROMIDE AND ALBUTEROL SULFATE 3 ML: 2.5; .5 SOLUTION RESPIRATORY (INHALATION) at 06:48

## 2022-03-28 RX ADMIN — CEFEPIME HYDROCHLORIDE 2 G: 2 INJECTION, POWDER, FOR SOLUTION INTRAVENOUS at 00:35

## 2022-03-28 RX ADMIN — Medication 10 ML: at 20:29

## 2022-03-28 RX ADMIN — ENOXAPARIN SODIUM 40 MG: 100 INJECTION SUBCUTANEOUS at 20:29

## 2022-03-28 RX ADMIN — INSULIN LISPRO 2 UNITS: 100 INJECTION, SOLUTION INTRAVENOUS; SUBCUTANEOUS at 12:01

## 2022-03-29 LAB
ANION GAP SERPL CALCULATED.3IONS-SCNC: 23.3 MMOL/L (ref 5–15)
BASOPHILS # BLD AUTO: 0.02 10*3/MM3 (ref 0–0.2)
BASOPHILS NFR BLD AUTO: 0.2 % (ref 0–1.5)
BUN SERPL-MCNC: 10 MG/DL (ref 8–23)
BUN/CREAT SERPL: 11.1 (ref 7–25)
CALCIUM SPEC-SCNC: 8.5 MG/DL (ref 8.6–10.5)
CHLORIDE SERPL-SCNC: 103 MMOL/L (ref 98–107)
CO2 SERPL-SCNC: 19.7 MMOL/L (ref 22–29)
CREAT SERPL-MCNC: 0.9 MG/DL (ref 0.76–1.27)
DEPRECATED RDW RBC AUTO: 40.8 FL (ref 37–54)
EGFRCR SERPLBLD CKD-EPI 2021: 97.2 ML/MIN/1.73
EOSINOPHIL # BLD AUTO: 0.06 10*3/MM3 (ref 0–0.4)
EOSINOPHIL NFR BLD AUTO: 0.6 % (ref 0.3–6.2)
ERYTHROCYTE [DISTWIDTH] IN BLOOD BY AUTOMATED COUNT: 13.1 % (ref 12.3–15.4)
GLUCOSE BLDC GLUCOMTR-MCNC: 108 MG/DL (ref 70–130)
GLUCOSE BLDC GLUCOMTR-MCNC: 156 MG/DL (ref 70–130)
GLUCOSE BLDC GLUCOMTR-MCNC: 164 MG/DL (ref 70–130)
GLUCOSE BLDC GLUCOMTR-MCNC: 93 MG/DL (ref 70–130)
GLUCOSE SERPL-MCNC: 110 MG/DL (ref 65–99)
HCT VFR BLD AUTO: 37.6 % (ref 37.5–51)
HGB BLD-MCNC: 12.4 G/DL (ref 13–17.7)
IMM GRANULOCYTES # BLD AUTO: 0.07 10*3/MM3 (ref 0–0.05)
IMM GRANULOCYTES NFR BLD AUTO: 0.7 % (ref 0–0.5)
LYMPHOCYTES # BLD AUTO: 1.11 10*3/MM3 (ref 0.7–3.1)
LYMPHOCYTES NFR BLD AUTO: 11.4 % (ref 19.6–45.3)
MCH RBC QN AUTO: 27.9 PG (ref 26.6–33)
MCHC RBC AUTO-ENTMCNC: 33 G/DL (ref 31.5–35.7)
MCV RBC AUTO: 84.7 FL (ref 79–97)
MONOCYTES # BLD AUTO: 1.11 10*3/MM3 (ref 0.1–0.9)
MONOCYTES NFR BLD AUTO: 11.4 % (ref 5–12)
NEUTROPHILS NFR BLD AUTO: 7.33 10*3/MM3 (ref 1.7–7)
NEUTROPHILS NFR BLD AUTO: 75.7 % (ref 42.7–76)
NRBC BLD AUTO-RTO: 0 /100 WBC (ref 0–0.2)
PLATELET # BLD AUTO: 155 10*3/MM3 (ref 140–450)
PMV BLD AUTO: 10.6 FL (ref 6–12)
POTASSIUM SERPL-SCNC: 4.3 MMOL/L (ref 3.5–5.2)
RBC # BLD AUTO: 4.44 10*6/MM3 (ref 4.14–5.8)
SODIUM SERPL-SCNC: 146 MMOL/L (ref 136–145)
WBC NRBC COR # BLD: 9.7 10*3/MM3 (ref 3.4–10.8)

## 2022-03-29 PROCEDURE — 94799 UNLISTED PULMONARY SVC/PX: CPT

## 2022-03-29 PROCEDURE — 63710000001 INSULIN LISPRO (HUMAN) PER 5 UNITS: Performed by: NURSE PRACTITIONER

## 2022-03-29 PROCEDURE — 99232 SBSQ HOSP IP/OBS MODERATE 35: CPT | Performed by: INTERNAL MEDICINE

## 2022-03-29 PROCEDURE — 25010000002 CEFEPIME PER 500 MG: Performed by: STUDENT IN AN ORGANIZED HEALTH CARE EDUCATION/TRAINING PROGRAM

## 2022-03-29 PROCEDURE — 80048 BASIC METABOLIC PNL TOTAL CA: CPT | Performed by: STUDENT IN AN ORGANIZED HEALTH CARE EDUCATION/TRAINING PROGRAM

## 2022-03-29 PROCEDURE — 82962 GLUCOSE BLOOD TEST: CPT

## 2022-03-29 PROCEDURE — 25010000002 ENOXAPARIN PER 10 MG: Performed by: HOSPITALIST

## 2022-03-29 PROCEDURE — 94761 N-INVAS EAR/PLS OXIMETRY MLT: CPT

## 2022-03-29 PROCEDURE — 85025 COMPLETE CBC W/AUTO DIFF WBC: CPT | Performed by: STUDENT IN AN ORGANIZED HEALTH CARE EDUCATION/TRAINING PROGRAM

## 2022-03-29 RX ADMIN — ACETAMINOPHEN 650 MG: 325 TABLET, FILM COATED ORAL at 20:53

## 2022-03-29 RX ADMIN — CEFEPIME HYDROCHLORIDE 2 G: 2 INJECTION, POWDER, FOR SOLUTION INTRAVENOUS at 16:37

## 2022-03-29 RX ADMIN — IPRATROPIUM BROMIDE AND ALBUTEROL SULFATE 3 ML: 2.5; .5 SOLUTION RESPIRATORY (INHALATION) at 16:32

## 2022-03-29 RX ADMIN — PREGABALIN 300 MG: 100 CAPSULE ORAL at 10:05

## 2022-03-29 RX ADMIN — ACETAMINOPHEN 650 MG: 325 TABLET, FILM COATED ORAL at 16:37

## 2022-03-29 RX ADMIN — CETIRIZINE HYDROCHLORIDE 10 MG: 10 TABLET ORAL at 10:05

## 2022-03-29 RX ADMIN — IPRATROPIUM BROMIDE AND ALBUTEROL SULFATE 3 ML: 2.5; .5 SOLUTION RESPIRATORY (INHALATION) at 08:52

## 2022-03-29 RX ADMIN — BENZONATATE 100 MG: 100 CAPSULE ORAL at 05:04

## 2022-03-29 RX ADMIN — HYDROCODONE POLISTIREX AND CHLORPHENIRAMINE POLISITREX 5 ML: 10; 8 SUSPENSION, EXTENDED RELEASE ORAL at 20:54

## 2022-03-29 RX ADMIN — BENZONATATE 100 MG: 100 CAPSULE ORAL at 18:23

## 2022-03-29 RX ADMIN — IPRATROPIUM BROMIDE AND ALBUTEROL SULFATE 3 ML: 2.5; .5 SOLUTION RESPIRATORY (INHALATION) at 19:20

## 2022-03-29 RX ADMIN — INSULIN LISPRO 2 UNITS: 100 INJECTION, SOLUTION INTRAVENOUS; SUBCUTANEOUS at 12:25

## 2022-03-29 RX ADMIN — CEFEPIME HYDROCHLORIDE 2 G: 2 INJECTION, POWDER, FOR SOLUTION INTRAVENOUS at 01:03

## 2022-03-29 RX ADMIN — PREGABALIN 300 MG: 100 CAPSULE ORAL at 20:53

## 2022-03-29 RX ADMIN — IPRATROPIUM BROMIDE AND ALBUTEROL SULFATE 3 ML: 2.5; .5 SOLUTION RESPIRATORY (INHALATION) at 12:35

## 2022-03-29 RX ADMIN — ACETAMINOPHEN 650 MG: 325 TABLET, FILM COATED ORAL at 01:02

## 2022-03-29 RX ADMIN — ENOXAPARIN SODIUM 40 MG: 100 INJECTION SUBCUTANEOUS at 21:00

## 2022-03-29 RX ADMIN — CEFEPIME HYDROCHLORIDE 2 G: 2 INJECTION, POWDER, FOR SOLUTION INTRAVENOUS at 10:05

## 2022-03-29 RX ADMIN — ACETAMINOPHEN 650 MG: 325 TABLET, FILM COATED ORAL at 05:04

## 2022-03-29 RX ADMIN — Medication 10 ML: at 10:05

## 2022-03-29 RX ADMIN — Medication 10 ML: at 20:53

## 2022-03-30 LAB
ANION GAP SERPL CALCULATED.3IONS-SCNC: 10 MMOL/L (ref 5–15)
BASOPHILS # BLD AUTO: 0.02 10*3/MM3 (ref 0–0.2)
BASOPHILS NFR BLD AUTO: 0.3 % (ref 0–1.5)
BUN SERPL-MCNC: 12 MG/DL (ref 8–23)
BUN/CREAT SERPL: 13.8 (ref 7–25)
CALCIUM SPEC-SCNC: 8.9 MG/DL (ref 8.6–10.5)
CHLORIDE SERPL-SCNC: 106 MMOL/L (ref 98–107)
CO2 SERPL-SCNC: 25 MMOL/L (ref 22–29)
CREAT SERPL-MCNC: 0.87 MG/DL (ref 0.76–1.27)
DEPRECATED RDW RBC AUTO: 40.8 FL (ref 37–54)
EGFRCR SERPLBLD CKD-EPI 2021: 98.2 ML/MIN/1.73
EOSINOPHIL # BLD AUTO: 0.21 10*3/MM3 (ref 0–0.4)
EOSINOPHIL NFR BLD AUTO: 2.9 % (ref 0.3–6.2)
ERYTHROCYTE [DISTWIDTH] IN BLOOD BY AUTOMATED COUNT: 13.1 % (ref 12.3–15.4)
GLUCOSE BLDC GLUCOMTR-MCNC: 129 MG/DL (ref 70–130)
GLUCOSE BLDC GLUCOMTR-MCNC: 147 MG/DL (ref 70–130)
GLUCOSE BLDC GLUCOMTR-MCNC: 218 MG/DL (ref 70–130)
GLUCOSE BLDC GLUCOMTR-MCNC: 243 MG/DL (ref 70–130)
GLUCOSE SERPL-MCNC: 119 MG/DL (ref 65–99)
HCT VFR BLD AUTO: 40.3 % (ref 37.5–51)
HGB BLD-MCNC: 13.4 G/DL (ref 13–17.7)
IMM GRANULOCYTES # BLD AUTO: 0.08 10*3/MM3 (ref 0–0.05)
IMM GRANULOCYTES NFR BLD AUTO: 1.1 % (ref 0–0.5)
LYMPHOCYTES # BLD AUTO: 1.22 10*3/MM3 (ref 0.7–3.1)
LYMPHOCYTES NFR BLD AUTO: 17 % (ref 19.6–45.3)
MCH RBC QN AUTO: 28.2 PG (ref 26.6–33)
MCHC RBC AUTO-ENTMCNC: 33.3 G/DL (ref 31.5–35.7)
MCV RBC AUTO: 84.8 FL (ref 79–97)
MONOCYTES # BLD AUTO: 0.9 10*3/MM3 (ref 0.1–0.9)
MONOCYTES NFR BLD AUTO: 12.5 % (ref 5–12)
NEUTROPHILS NFR BLD AUTO: 4.75 10*3/MM3 (ref 1.7–7)
NEUTROPHILS NFR BLD AUTO: 66.2 % (ref 42.7–76)
NRBC BLD AUTO-RTO: 0 /100 WBC (ref 0–0.2)
PLATELET # BLD AUTO: 165 10*3/MM3 (ref 140–450)
PMV BLD AUTO: 10.3 FL (ref 6–12)
POTASSIUM SERPL-SCNC: 4.4 MMOL/L (ref 3.5–5.2)
PROCALCITONIN SERPL-MCNC: 2.48 NG/ML (ref 0–0.25)
RBC # BLD AUTO: 4.75 10*6/MM3 (ref 4.14–5.8)
SODIUM SERPL-SCNC: 141 MMOL/L (ref 136–145)
WBC NRBC COR # BLD: 7.18 10*3/MM3 (ref 3.4–10.8)

## 2022-03-30 PROCEDURE — 94761 N-INVAS EAR/PLS OXIMETRY MLT: CPT

## 2022-03-30 PROCEDURE — 92526 ORAL FUNCTION THERAPY: CPT

## 2022-03-30 PROCEDURE — 25010000002 ENOXAPARIN PER 10 MG: Performed by: HOSPITALIST

## 2022-03-30 PROCEDURE — 99232 SBSQ HOSP IP/OBS MODERATE 35: CPT | Performed by: INTERNAL MEDICINE

## 2022-03-30 PROCEDURE — 94799 UNLISTED PULMONARY SVC/PX: CPT

## 2022-03-30 PROCEDURE — 84145 PROCALCITONIN (PCT): CPT | Performed by: INTERNAL MEDICINE

## 2022-03-30 PROCEDURE — 82962 GLUCOSE BLOOD TEST: CPT

## 2022-03-30 PROCEDURE — 25010000002 CEFEPIME PER 500 MG: Performed by: STUDENT IN AN ORGANIZED HEALTH CARE EDUCATION/TRAINING PROGRAM

## 2022-03-30 PROCEDURE — 80048 BASIC METABOLIC PNL TOTAL CA: CPT | Performed by: STUDENT IN AN ORGANIZED HEALTH CARE EDUCATION/TRAINING PROGRAM

## 2022-03-30 PROCEDURE — 87070 CULTURE OTHR SPECIMN AEROBIC: CPT | Performed by: INTERNAL MEDICINE

## 2022-03-30 PROCEDURE — 63710000001 INSULIN LISPRO (HUMAN) PER 5 UNITS: Performed by: NURSE PRACTITIONER

## 2022-03-30 PROCEDURE — 85025 COMPLETE CBC W/AUTO DIFF WBC: CPT | Performed by: STUDENT IN AN ORGANIZED HEALTH CARE EDUCATION/TRAINING PROGRAM

## 2022-03-30 PROCEDURE — 94664 DEMO&/EVAL PT USE INHALER: CPT

## 2022-03-30 PROCEDURE — 87205 SMEAR GRAM STAIN: CPT | Performed by: INTERNAL MEDICINE

## 2022-03-30 PROCEDURE — 87040 BLOOD CULTURE FOR BACTERIA: CPT | Performed by: HOSPITALIST

## 2022-03-30 RX ORDER — TAMSULOSIN HYDROCHLORIDE 0.4 MG/1
0.4 CAPSULE ORAL DAILY
Status: DISCONTINUED | OUTPATIENT
Start: 2022-03-30 | End: 2022-04-03 | Stop reason: HOSPADM

## 2022-03-30 RX ORDER — ACETAMINOPHEN 500 MG
1000 TABLET ORAL 3 TIMES DAILY
Status: DISCONTINUED | OUTPATIENT
Start: 2022-03-30 | End: 2022-03-31

## 2022-03-30 RX ADMIN — Medication 10 ML: at 22:04

## 2022-03-30 RX ADMIN — IPRATROPIUM BROMIDE AND ALBUTEROL SULFATE 3 ML: 2.5; .5 SOLUTION RESPIRATORY (INHALATION) at 11:35

## 2022-03-30 RX ADMIN — ENOXAPARIN SODIUM 40 MG: 100 INJECTION SUBCUTANEOUS at 18:04

## 2022-03-30 RX ADMIN — ACETAMINOPHEN 1000 MG: 500 TABLET ORAL at 18:04

## 2022-03-30 RX ADMIN — IPRATROPIUM BROMIDE AND ALBUTEROL SULFATE 3 ML: 2.5; .5 SOLUTION RESPIRATORY (INHALATION) at 15:11

## 2022-03-30 RX ADMIN — PREGABALIN 300 MG: 100 CAPSULE ORAL at 22:03

## 2022-03-30 RX ADMIN — Medication 10 ML: at 08:43

## 2022-03-30 RX ADMIN — PREGABALIN 300 MG: 100 CAPSULE ORAL at 08:42

## 2022-03-30 RX ADMIN — ACETAMINOPHEN 650 MG: 325 TABLET, FILM COATED ORAL at 13:40

## 2022-03-30 RX ADMIN — ACETAMINOPHEN 650 MG: 325 TABLET, FILM COATED ORAL at 08:42

## 2022-03-30 RX ADMIN — TAMSULOSIN HYDROCHLORIDE 0.4 MG: 0.4 CAPSULE ORAL at 22:03

## 2022-03-30 RX ADMIN — IPRATROPIUM BROMIDE AND ALBUTEROL SULFATE 3 ML: 2.5; .5 SOLUTION RESPIRATORY (INHALATION) at 19:20

## 2022-03-30 RX ADMIN — INSULIN LISPRO 4 UNITS: 100 INJECTION, SOLUTION INTRAVENOUS; SUBCUTANEOUS at 18:19

## 2022-03-30 RX ADMIN — CEFEPIME HYDROCHLORIDE 2 G: 2 INJECTION, POWDER, FOR SOLUTION INTRAVENOUS at 10:36

## 2022-03-30 RX ADMIN — CETIRIZINE HYDROCHLORIDE 10 MG: 10 TABLET ORAL at 08:42

## 2022-03-30 RX ADMIN — IPRATROPIUM BROMIDE AND ALBUTEROL SULFATE 3 ML: 2.5; .5 SOLUTION RESPIRATORY (INHALATION) at 07:51

## 2022-03-30 RX ADMIN — CEFEPIME HYDROCHLORIDE 2 G: 2 INJECTION, POWDER, FOR SOLUTION INTRAVENOUS at 02:36

## 2022-03-30 RX ADMIN — ACETAMINOPHEN 650 MG: 325 TABLET, FILM COATED ORAL at 02:37

## 2022-03-30 RX ADMIN — CEFEPIME HYDROCHLORIDE 2 G: 2 INJECTION, POWDER, FOR SOLUTION INTRAVENOUS at 18:13

## 2022-03-30 RX ADMIN — HYDROCODONE POLISTIREX AND CHLORPHENIRAMINE POLISITREX 5 ML: 10; 8 SUSPENSION, EXTENDED RELEASE ORAL at 08:42

## 2022-03-30 RX ADMIN — ACETAMINOPHEN 1000 MG: 500 TABLET ORAL at 22:03

## 2022-03-30 RX ADMIN — BENZONATATE 100 MG: 100 CAPSULE ORAL at 02:37

## 2022-03-31 ENCOUNTER — APPOINTMENT (OUTPATIENT)
Dept: GENERAL RADIOLOGY | Facility: HOSPITAL | Age: 62
End: 2022-03-31

## 2022-03-31 LAB
ALBUMIN SERPL-MCNC: 3.4 G/DL (ref 3.5–5.2)
ALP SERPL-CCNC: 177 U/L (ref 39–117)
ALT SERPL W P-5'-P-CCNC: 38 U/L (ref 1–41)
ANION GAP SERPL CALCULATED.3IONS-SCNC: 11 MMOL/L (ref 5–15)
AST SERPL-CCNC: 34 U/L (ref 1–40)
BACTERIA SPEC AEROBE CULT: NORMAL
BACTERIA SPEC AEROBE CULT: NORMAL
BASOPHILS # BLD AUTO: 0.03 10*3/MM3 (ref 0–0.2)
BASOPHILS NFR BLD AUTO: 0.5 % (ref 0–1.5)
BILIRUB CONJ SERPL-MCNC: 0.2 MG/DL (ref 0–0.3)
BILIRUB INDIRECT SERPL-MCNC: 0.2 MG/DL
BILIRUB SERPL-MCNC: 0.4 MG/DL (ref 0–1.2)
BILIRUB UR QL STRIP: NEGATIVE
BUN SERPL-MCNC: 12 MG/DL (ref 8–23)
BUN/CREAT SERPL: 12.9 (ref 7–25)
CALCIUM SPEC-SCNC: 8.3 MG/DL (ref 8.6–10.5)
CHLORIDE SERPL-SCNC: 103 MMOL/L (ref 98–107)
CLARITY UR: CLEAR
CO2 SERPL-SCNC: 24 MMOL/L (ref 22–29)
COLOR UR: YELLOW
CREAT SERPL-MCNC: 0.93 MG/DL (ref 0.76–1.27)
DEPRECATED RDW RBC AUTO: 40.1 FL (ref 37–54)
EGFRCR SERPLBLD CKD-EPI 2021: 93.4 ML/MIN/1.73
EOSINOPHIL # BLD AUTO: 0.14 10*3/MM3 (ref 0–0.4)
EOSINOPHIL NFR BLD AUTO: 2.6 % (ref 0.3–6.2)
ERYTHROCYTE [DISTWIDTH] IN BLOOD BY AUTOMATED COUNT: 13 % (ref 12.3–15.4)
GLUCOSE BLDC GLUCOMTR-MCNC: 198 MG/DL (ref 70–130)
GLUCOSE BLDC GLUCOMTR-MCNC: 223 MG/DL (ref 70–130)
GLUCOSE BLDC GLUCOMTR-MCNC: 261 MG/DL (ref 70–130)
GLUCOSE BLDC GLUCOMTR-MCNC: 268 MG/DL (ref 70–130)
GLUCOSE SERPL-MCNC: 244 MG/DL (ref 65–99)
GLUCOSE UR STRIP-MCNC: ABNORMAL MG/DL
HCT VFR BLD AUTO: 38.4 % (ref 37.5–51)
HGB BLD-MCNC: 12.6 G/DL (ref 13–17.7)
HGB UR QL STRIP.AUTO: NEGATIVE
IMM GRANULOCYTES # BLD AUTO: 0.11 10*3/MM3 (ref 0–0.05)
IMM GRANULOCYTES NFR BLD AUTO: 2 % (ref 0–0.5)
KETONES UR QL STRIP: NEGATIVE
LEUKOCYTE ESTERASE UR QL STRIP.AUTO: NEGATIVE
LYMPHOCYTES # BLD AUTO: 0.95 10*3/MM3 (ref 0.7–3.1)
LYMPHOCYTES NFR BLD AUTO: 17.3 % (ref 19.6–45.3)
MCH RBC QN AUTO: 28.3 PG (ref 26.6–33)
MCHC RBC AUTO-ENTMCNC: 32.8 G/DL (ref 31.5–35.7)
MCV RBC AUTO: 86.1 FL (ref 79–97)
MONOCYTES # BLD AUTO: 0.68 10*3/MM3 (ref 0.1–0.9)
MONOCYTES NFR BLD AUTO: 12.4 % (ref 5–12)
NEUTROPHILS NFR BLD AUTO: 3.58 10*3/MM3 (ref 1.7–7)
NEUTROPHILS NFR BLD AUTO: 65.2 % (ref 42.7–76)
NITRITE UR QL STRIP: NEGATIVE
NRBC BLD AUTO-RTO: 0 /100 WBC (ref 0–0.2)
PH UR STRIP.AUTO: 5.5 [PH] (ref 5–8)
PLATELET # BLD AUTO: 160 10*3/MM3 (ref 140–450)
PMV BLD AUTO: 9.8 FL (ref 6–12)
POTASSIUM SERPL-SCNC: 3.4 MMOL/L (ref 3.5–5.2)
PROT SERPL-MCNC: 6.1 G/DL (ref 6–8.5)
PROT UR QL STRIP: NEGATIVE
RBC # BLD AUTO: 4.46 10*6/MM3 (ref 4.14–5.8)
SARS-COV-2 RNA PNL SPEC NAA+PROBE: NOT DETECTED
SODIUM SERPL-SCNC: 138 MMOL/L (ref 136–145)
SP GR UR STRIP: 1.02 (ref 1–1.03)
UROBILINOGEN UR QL STRIP: ABNORMAL
WBC NRBC COR # BLD: 5.49 10*3/MM3 (ref 3.4–10.8)

## 2022-03-31 PROCEDURE — 80076 HEPATIC FUNCTION PANEL: CPT | Performed by: HOSPITALIST

## 2022-03-31 PROCEDURE — 25010000002 CEFEPIME PER 500 MG: Performed by: STUDENT IN AN ORGANIZED HEALTH CARE EDUCATION/TRAINING PROGRAM

## 2022-03-31 PROCEDURE — 80048 BASIC METABOLIC PNL TOTAL CA: CPT | Performed by: STUDENT IN AN ORGANIZED HEALTH CARE EDUCATION/TRAINING PROGRAM

## 2022-03-31 PROCEDURE — 94799 UNLISTED PULMONARY SVC/PX: CPT

## 2022-03-31 PROCEDURE — 71046 X-RAY EXAM CHEST 2 VIEWS: CPT

## 2022-03-31 PROCEDURE — 94664 DEMO&/EVAL PT USE INHALER: CPT

## 2022-03-31 PROCEDURE — 85025 COMPLETE CBC W/AUTO DIFF WBC: CPT | Performed by: STUDENT IN AN ORGANIZED HEALTH CARE EDUCATION/TRAINING PROGRAM

## 2022-03-31 PROCEDURE — 87635 SARS-COV-2 COVID-19 AMP PRB: CPT | Performed by: INTERNAL MEDICINE

## 2022-03-31 PROCEDURE — 84132 ASSAY OF SERUM POTASSIUM: CPT | Performed by: HOSPITALIST

## 2022-03-31 PROCEDURE — 25010000002 ENOXAPARIN PER 10 MG: Performed by: HOSPITALIST

## 2022-03-31 PROCEDURE — 81003 URINALYSIS AUTO W/O SCOPE: CPT | Performed by: HOSPITALIST

## 2022-03-31 PROCEDURE — 99232 SBSQ HOSP IP/OBS MODERATE 35: CPT | Performed by: INTERNAL MEDICINE

## 2022-03-31 PROCEDURE — 63710000001 INSULIN LISPRO (HUMAN) PER 5 UNITS: Performed by: NURSE PRACTITIONER

## 2022-03-31 PROCEDURE — 82962 GLUCOSE BLOOD TEST: CPT

## 2022-03-31 RX ORDER — ACETAMINOPHEN 325 MG/1
650 TABLET ORAL 3 TIMES DAILY
Status: DISCONTINUED | OUTPATIENT
Start: 2022-03-31 | End: 2022-04-02

## 2022-03-31 RX ADMIN — Medication 10 ML: at 20:49

## 2022-03-31 RX ADMIN — ENOXAPARIN SODIUM 40 MG: 100 INJECTION SUBCUTANEOUS at 18:23

## 2022-03-31 RX ADMIN — IPRATROPIUM BROMIDE AND ALBUTEROL SULFATE 3 ML: 2.5; .5 SOLUTION RESPIRATORY (INHALATION) at 07:02

## 2022-03-31 RX ADMIN — ACETAMINOPHEN 650 MG: 325 TABLET ORAL at 08:41

## 2022-03-31 RX ADMIN — PREGABALIN 300 MG: 100 CAPSULE ORAL at 20:49

## 2022-03-31 RX ADMIN — CETIRIZINE HYDROCHLORIDE 10 MG: 10 TABLET ORAL at 08:41

## 2022-03-31 RX ADMIN — CEFEPIME HYDROCHLORIDE 2 G: 2 INJECTION, POWDER, FOR SOLUTION INTRAVENOUS at 11:39

## 2022-03-31 RX ADMIN — CEFEPIME HYDROCHLORIDE 2 G: 2 INJECTION, POWDER, FOR SOLUTION INTRAVENOUS at 01:38

## 2022-03-31 RX ADMIN — BENZONATATE 100 MG: 100 CAPSULE ORAL at 08:41

## 2022-03-31 RX ADMIN — IPRATROPIUM BROMIDE AND ALBUTEROL SULFATE 3 ML: 2.5; .5 SOLUTION RESPIRATORY (INHALATION) at 11:33

## 2022-03-31 RX ADMIN — CEFEPIME HYDROCHLORIDE 2 G: 2 INJECTION, POWDER, FOR SOLUTION INTRAVENOUS at 18:21

## 2022-03-31 RX ADMIN — POTASSIUM CHLORIDE 40 MEQ: 10 TABLET, EXTENDED RELEASE ORAL at 16:56

## 2022-03-31 RX ADMIN — POTASSIUM CHLORIDE 40 MEQ: 10 TABLET, EXTENDED RELEASE ORAL at 11:40

## 2022-03-31 RX ADMIN — IPRATROPIUM BROMIDE AND ALBUTEROL SULFATE 3 ML: 2.5; .5 SOLUTION RESPIRATORY (INHALATION) at 15:01

## 2022-03-31 RX ADMIN — INSULIN LISPRO 6 UNITS: 100 INJECTION, SOLUTION INTRAVENOUS; SUBCUTANEOUS at 13:06

## 2022-03-31 RX ADMIN — ACETAMINOPHEN 650 MG: 325 TABLET ORAL at 20:49

## 2022-03-31 RX ADMIN — INSULIN LISPRO 6 UNITS: 100 INJECTION, SOLUTION INTRAVENOUS; SUBCUTANEOUS at 08:40

## 2022-03-31 RX ADMIN — INSULIN LISPRO 4 UNITS: 100 INJECTION, SOLUTION INTRAVENOUS; SUBCUTANEOUS at 18:21

## 2022-03-31 RX ADMIN — IPRATROPIUM BROMIDE AND ALBUTEROL SULFATE 3 ML: 2.5; .5 SOLUTION RESPIRATORY (INHALATION) at 20:08

## 2022-03-31 RX ADMIN — Medication 10 ML: at 08:41

## 2022-03-31 RX ADMIN — ACETAMINOPHEN 650 MG: 325 TABLET ORAL at 16:56

## 2022-03-31 RX ADMIN — PREGABALIN 300 MG: 100 CAPSULE ORAL at 08:41

## 2022-03-31 RX ADMIN — HYDROCODONE POLISTIREX AND CHLORPHENIRAMINE POLISITREX 5 ML: 10; 8 SUSPENSION, EXTENDED RELEASE ORAL at 22:05

## 2022-04-01 LAB
ANION GAP SERPL CALCULATED.3IONS-SCNC: 10.7 MMOL/L (ref 5–15)
BACTERIA SPEC RESP CULT: NORMAL
BASOPHILS # BLD AUTO: 0.05 10*3/MM3 (ref 0–0.2)
BASOPHILS NFR BLD AUTO: 0.6 % (ref 0–1.5)
BUN SERPL-MCNC: 10 MG/DL (ref 8–23)
BUN/CREAT SERPL: 13.2 (ref 7–25)
CALCIUM SPEC-SCNC: 9.1 MG/DL (ref 8.6–10.5)
CHLORIDE SERPL-SCNC: 104 MMOL/L (ref 98–107)
CO2 SERPL-SCNC: 20.3 MMOL/L (ref 22–29)
CREAT SERPL-MCNC: 0.76 MG/DL (ref 0.76–1.27)
DEPRECATED RDW RBC AUTO: 39.4 FL (ref 37–54)
EGFRCR SERPLBLD CKD-EPI 2021: 102.3 ML/MIN/1.73
EOSINOPHIL # BLD AUTO: 0.17 10*3/MM3 (ref 0–0.4)
EOSINOPHIL NFR BLD AUTO: 2.1 % (ref 0.3–6.2)
ERYTHROCYTE [DISTWIDTH] IN BLOOD BY AUTOMATED COUNT: 12.8 % (ref 12.3–15.4)
GLUCOSE BLDC GLUCOMTR-MCNC: 180 MG/DL (ref 70–130)
GLUCOSE BLDC GLUCOMTR-MCNC: 184 MG/DL (ref 70–130)
GLUCOSE BLDC GLUCOMTR-MCNC: 287 MG/DL (ref 70–130)
GLUCOSE BLDC GLUCOMTR-MCNC: 324 MG/DL (ref 70–130)
GLUCOSE SERPL-MCNC: 193 MG/DL (ref 65–99)
GRAM STN SPEC: NORMAL
HCT VFR BLD AUTO: 41 % (ref 37.5–51)
HGB BLD-MCNC: 13.7 G/DL (ref 13–17.7)
IMM GRANULOCYTES # BLD AUTO: 0.11 10*3/MM3 (ref 0–0.05)
IMM GRANULOCYTES NFR BLD AUTO: 1.4 % (ref 0–0.5)
LYMPHOCYTES # BLD AUTO: 1.06 10*3/MM3 (ref 0.7–3.1)
LYMPHOCYTES NFR BLD AUTO: 13.1 % (ref 19.6–45.3)
MCH RBC QN AUTO: 28.5 PG (ref 26.6–33)
MCHC RBC AUTO-ENTMCNC: 33.4 G/DL (ref 31.5–35.7)
MCV RBC AUTO: 85.4 FL (ref 79–97)
MONOCYTES # BLD AUTO: 0.65 10*3/MM3 (ref 0.1–0.9)
MONOCYTES NFR BLD AUTO: 8 % (ref 5–12)
NEUTROPHILS NFR BLD AUTO: 6.08 10*3/MM3 (ref 1.7–7)
NEUTROPHILS NFR BLD AUTO: 74.8 % (ref 42.7–76)
NRBC BLD AUTO-RTO: 0.1 /100 WBC (ref 0–0.2)
PLATELET # BLD AUTO: 231 10*3/MM3 (ref 140–450)
PMV BLD AUTO: 10.2 FL (ref 6–12)
POTASSIUM SERPL-SCNC: 4.3 MMOL/L (ref 3.5–5.2)
POTASSIUM SERPL-SCNC: 4.4 MMOL/L (ref 3.5–5.2)
RBC # BLD AUTO: 4.8 10*6/MM3 (ref 4.14–5.8)
SODIUM SERPL-SCNC: 135 MMOL/L (ref 136–145)
WBC NRBC COR # BLD: 8.12 10*3/MM3 (ref 3.4–10.8)

## 2022-04-01 PROCEDURE — 85025 COMPLETE CBC W/AUTO DIFF WBC: CPT | Performed by: STUDENT IN AN ORGANIZED HEALTH CARE EDUCATION/TRAINING PROGRAM

## 2022-04-01 PROCEDURE — 82962 GLUCOSE BLOOD TEST: CPT

## 2022-04-01 PROCEDURE — 94799 UNLISTED PULMONARY SVC/PX: CPT

## 2022-04-01 PROCEDURE — 25010000002 ENOXAPARIN PER 10 MG: Performed by: HOSPITALIST

## 2022-04-01 PROCEDURE — 63710000001 INSULIN LISPRO (HUMAN) PER 5 UNITS: Performed by: NURSE PRACTITIONER

## 2022-04-01 PROCEDURE — 80048 BASIC METABOLIC PNL TOTAL CA: CPT | Performed by: STUDENT IN AN ORGANIZED HEALTH CARE EDUCATION/TRAINING PROGRAM

## 2022-04-01 PROCEDURE — 25010000002 CEFEPIME PER 500 MG: Performed by: STUDENT IN AN ORGANIZED HEALTH CARE EDUCATION/TRAINING PROGRAM

## 2022-04-01 PROCEDURE — 99232 SBSQ HOSP IP/OBS MODERATE 35: CPT | Performed by: INTERNAL MEDICINE

## 2022-04-01 PROCEDURE — 94761 N-INVAS EAR/PLS OXIMETRY MLT: CPT

## 2022-04-01 RX ORDER — INSULIN LISPRO 100 [IU]/ML
0-9 INJECTION, SOLUTION INTRAVENOUS; SUBCUTANEOUS
Status: DISCONTINUED | OUTPATIENT
Start: 2022-04-01 | End: 2022-04-03 | Stop reason: HOSPADM

## 2022-04-01 RX ADMIN — IPRATROPIUM BROMIDE AND ALBUTEROL SULFATE 3 ML: 2.5; .5 SOLUTION RESPIRATORY (INHALATION) at 19:43

## 2022-04-01 RX ADMIN — CEFEPIME HYDROCHLORIDE 2 G: 2 INJECTION, POWDER, FOR SOLUTION INTRAVENOUS at 03:27

## 2022-04-01 RX ADMIN — PREGABALIN 300 MG: 100 CAPSULE ORAL at 20:21

## 2022-04-01 RX ADMIN — CEFEPIME HYDROCHLORIDE 2 G: 2 INJECTION, POWDER, FOR SOLUTION INTRAVENOUS at 11:51

## 2022-04-01 RX ADMIN — IPRATROPIUM BROMIDE AND ALBUTEROL SULFATE 3 ML: 2.5; .5 SOLUTION RESPIRATORY (INHALATION) at 15:13

## 2022-04-01 RX ADMIN — IPRATROPIUM BROMIDE AND ALBUTEROL SULFATE 3 ML: 2.5; .5 SOLUTION RESPIRATORY (INHALATION) at 07:51

## 2022-04-01 RX ADMIN — Medication 10 ML: at 08:56

## 2022-04-01 RX ADMIN — INSULIN LISPRO 7 UNITS: 100 INJECTION, SOLUTION INTRAVENOUS; SUBCUTANEOUS at 20:21

## 2022-04-01 RX ADMIN — Medication 10 ML: at 21:00

## 2022-04-01 RX ADMIN — ACETAMINOPHEN 650 MG: 325 TABLET ORAL at 15:24

## 2022-04-01 RX ADMIN — ACETAMINOPHEN 650 MG: 325 TABLET ORAL at 08:56

## 2022-04-01 RX ADMIN — INSULIN LISPRO 6 UNITS: 100 INJECTION, SOLUTION INTRAVENOUS; SUBCUTANEOUS at 17:18

## 2022-04-01 RX ADMIN — IPRATROPIUM BROMIDE AND ALBUTEROL SULFATE 3 ML: 2.5; .5 SOLUTION RESPIRATORY (INHALATION) at 10:30

## 2022-04-01 RX ADMIN — ENOXAPARIN SODIUM 40 MG: 100 INJECTION SUBCUTANEOUS at 18:17

## 2022-04-01 RX ADMIN — HYDROCODONE POLISTIREX AND CHLORPHENIRAMINE POLISITREX 5 ML: 10; 8 SUSPENSION, EXTENDED RELEASE ORAL at 17:18

## 2022-04-01 RX ADMIN — CEFEPIME HYDROCHLORIDE 2 G: 2 INJECTION, POWDER, FOR SOLUTION INTRAVENOUS at 18:17

## 2022-04-01 NOTE — CASE MANAGEMENT/SOCIAL WORK
Continued Stay Note  [facility]     Patient Name: Saul Berrios  MRN: [id number]  Today's Date: 4/1/2022    Admit Date: 3/26/2022     Discharge Plan     Row Name 04/01/22 1222       Plan    Plan Return home with family.    Patient/Family in Agreement with Plan yes    Plan Comments Waiting for bronch today.  Plan remains to return home with family.  CCP continues to follow.  BHumeniuk RN                       Expected Discharge Date and Time     Expected Discharge Date Expected Discharge Time    Apr 4, 2022             Becky S. Humeniuk, RN

## 2022-04-01 NOTE — PROGRESS NOTES
LOS: 6 days     Chief Complaint:  Follow-up pneumonia    Interval History:  He and his daughter say he continues to improve. Less cough he remains on room air. He did have a fever at 0700 but normal temps since then. His WBC remains normal. Cultures are negative. CXR yesterday showed improvement so bronch cancelled.     ROS: no v/d/rashes    Vital Signs  Temp:  [97.6 °F (36.4 °C)-101.4 °F (38.6 °C)] 97.8 °F (36.6 °C)  Heart Rate:  [70-84] 80  Resp:  [16-18] 16  BP: (109-118)/(70-75) 109/70    Physical Exam:  General: awake, alert, very nice  Eyes: no scleral icterus  ENT: poor dentition, coated tongue, no thrush  Cardiovascular: NR  Respiratory: BLL rales are improved; on RA  GI: Abdomen is soft  :  no Lim catheter  Musculoskeletal:  normal musculature  Skin: No rashes  Neurological: Alert and oriented x 3  Psychiatric: Normal mood and affect     Antibiotics:  •  cefepime 2 gm IV q8h    LABS:  CBC, BMP, micro reviewed today  Lab Results   Component Value Date    WBC 8.12 04/01/2022    HGB 13.7 04/01/2022    HCT 41.0 04/01/2022    MCV 85.4 04/01/2022     04/01/2022     Lab Results   Component Value Date    GLUCOSE 193 (H) 04/01/2022    BUN 10 04/01/2022    CREATININE 0.76 04/01/2022    BCR 13.2 04/01/2022    CO2 20.3 (L) 04/01/2022    CALCIUM 9.1 04/01/2022    ALBUMIN 3.40 (L) 03/31/2022    LABIL2 1.5 09/20/2021    AST 34 03/31/2022    ALT 38 03/31/2022     Lab Results   Component Value Date    HGBA1C 8.80 (H) 03/26/2022     HIV Ab negative  Procal 0.87--->3.2-->2.4    Microbiology:  3/26 BCx: negative  3/26 MRSA nares: negative  3/26 RPP: negative  3/26 COVID: negative  3/26 Strep and Legionella Ur Ag: negative  3/27 Fungus BCx: NGTD  3/30 SpCx: normal tian  3/31 COVID: negative    Radiology (personally reviewed report):   CXR 3/31 shows slight improvement in infiltrates    Prior Radiology:  CTA Chest: no PE; BLL pneumonia and effusions    Assessment/Plan   1. Bilateral lower lobe pneumonia - better  2.  Fever in adult - intermittent  3. Uncontrolled DM2 - A1c 8.8%  4. History of pulmonary TB in 2015 - treated [facility] - records requested     He remains on room air with a normal WBC and negative cultures. He reports subjective improvement. Procal had been trending down on last check. Bronch cancelled due to improving symptoms and imaging. He still has intermittent fever though the curve is much improved compared to admission. Continue cefepime 2 g IV q8h through 4/2/22 to complete a 7-day course.     Thank you for allowing me to be involved in the care of this patient. Infectious diseases will sign off at this time with antibiotics plan in place, but please call me at [phone] if any further ID questions or new ID concerns. D/W Dr Yoo.

## 2022-04-01 NOTE — PROGRESS NOTES
[facility]     LOS: 6 days     Subjective:    CC:Shortness of Breath, Fever, and Cough    DIET:  Diet Order   Procedures   • Diet Regular; Consistent Carbohydrate   temp spike overnight, no temperature spikes reported today, patient is feeling much better and tolerating oral intake.    Objective:    Vital Signs:  Temp:  [97.6 °F (36.4 °C)-101.4 °F (38.6 °C)] 97.8 °F (36.6 °C)  Heart Rate:  [70-84] 80  Resp:  [16-18] 16  BP: (109-118)/(70-75) 109/70    SpO2:  [92 %-100 %] 96 %  on   ;   Device (Oxygen Therapy): room air  Body mass index is 27.46 kg/m².    Physical Exam  Constitutional:       Appearance: Normal appearance.   HENT:      Head: Normocephalic and atraumatic.   Cardiovascular:      Rate and Rhythm: Normal rate and regular rhythm.      Heart sounds: No murmur heard.    No friction rub.   Pulmonary:      Effort: Pulmonary effort is normal.      Breath sounds: Normal breath sounds.   Abdominal:      General: Bowel sounds are normal. There is no distension.      Palpations: Abdomen is soft.      Tenderness: There is no abdominal tenderness.   Skin:     General: Skin is warm and dry.   Neurological:      Mental Status: He is alert.   Psychiatric:         Mood and Affect: Mood normal.         Behavior: Behavior normal.         Results Review:    Glucose   Date Value Ref Range Status   04/01/2022 193 (H) 65 - 99 mg/dL Final   03/31/2022 244 (H) 65 - 99 mg/dL Final   03/30/2022 119 (H) 65 - 99 mg/dL Final     Results from last 7 days   Lab Units 04/01/22  0637   WBC 10*3/mm3 8.12   HEMOGLOBIN g/dL 13.7   HEMATOCRIT % 41.0   PLATELETS 10*3/mm3 231     Results from last 7 days   Lab Units 04/01/22  0637 03/31/22  2310 03/31/22  0759   SODIUM mmol/L 135*  --  138   POTASSIUM mmol/L 4.3   < > 3.4*   CHLORIDE mmol/L 104  --  103   CO2 mmol/L 20.3*  --  24.0   BUN mg/dL 10  --  12   CREATININE mg/dL 0.76  --  0.93   CALCIUM mg/dL 9.1  --  8.3*   BILIRUBIN mg/dL  --   --  0.4   ALK PHOS U/L   --   --  177*   ALT (SGPT) U/L  --   --  38   AST (SGOT) U/L  --   --  34   GLUCOSE mg/dL 193*  --  244*    < > = values in this interval not displayed.         Results from last 7 days   Lab Units 03/27/22  0936   MAGNESIUM mg/dL 1.9     Results from last 7 days   Lab Units 03/28/22  0749   TROPONIN T ng/mL <0.010     Cultures:  Blood Culture   Date Value Ref Range Status   03/26/2022 No growth at 2 days  Preliminary   03/26/2022 No growth at 2 days  Preliminary       I have reviewed daily medications and changes in CPOE    Scheduled meds  acetaminophen, 650 mg, Oral, TID  cefepime, 2 g, Intravenous, Q8H  cetirizine, 10 mg, Oral, Daily  enoxaparin, 40 mg, Subcutaneous, Q24H  insulin lispro, 0-9 Units, Subcutaneous, TID AC  ipratropium-albuterol, 3 mL, Nebulization, 4x Daily - RT  pregabalin, 300 mg, Oral, BID  sodium chloride, 10 mL, Intravenous, Q12H  tamsulosin, 0.4 mg, Oral, Daily           PRN meds  •  acetaminophen **OR** acetaminophen **OR** acetaminophen  •  benzonatate  •  dextrose  •  dextrose  •  glucagon (human recombinant)  •  guaifenesin-dextromethorphan  •  HYDROcod Polst-CPM Polst ER  •  ipratropium-albuterol  •  magnesium sulfate **OR** magnesium sulfate **OR** magnesium sulfate  •  nitroglycerin  •  ondansetron **OR** ondansetron  •  potassium chloride  •  potassium chloride  •  potassium phosphate infusion greater than 15 mMoles **OR** potassium phosphate infusion greater than 15 mMoles **OR** potassium phosphate **OR** sodium phosphate IVPB **OR** sodium phosphate IVPB  •  [COMPLETED] Insert peripheral IV **AND** sodium chloride  •  sodium chloride        Community acquired pneumonia, unspecified laterality    DM (diabetes mellitus), type 2 (HCC)    HTN (hypertension)    GERD without esophagitis    Thrombocytopenia (HCC)    Fever        Assessment/Plan:  62-year-old male with a history of diabetes who presented with fever and nonproductive cough, worsening for last week.  He failed outpatient  course of antibiotics.  Chest x-ray shows left basilar infiltrate.  He is admitted for community-acquired pneumonia.      Today: fever spike this am    Community-acquired pneumonia, with sepsis on admission:   -On cefepime through tomorrow.   Strep pneumo and Legionella urine antigens, respiratory viral panel negative, and MRSA nares negative  -Follow-up blood cultures      Possibly has component of urinary tract infection  -Will need follow-up for this with urology outpatient, at least will need follow-up scope  -check a urinalysis    Type 2 diabetes.  A1c 8.8:   -Hold home oral medications.  Sliding scale insulin for now      Thrombocytopenia: Mild on admission, possibly related to acute illness.    platelets have normalized     · SCDs for DVT prophylaxis.    Lovenox for DVT prophylaxis  · Full code.  · Discussed with patient and family   · Anticipate discharge home, 1-2 days          Calderon Yoo MD  04/01/22  16:44 EDT

## 2022-04-01 NOTE — PLAN OF CARE
Goal Outcome Evaluation:       used to communicate with patient. Tussionex given for hacking cough and patient appears to have improved significantly. IV abx continued. NPO since midnight. Daughter at bedside throughout shift. Patient ad neo to bathroom. Headache relieved by Tylenol. VSS.

## 2022-04-01 NOTE — PROGRESS NOTES
"  PROGRESS NOTE  Patient Name: Saul Berrios  Age/Sex: 62 y.o. male  : 1960  MRN: [id number]    Date of Admission: 3/26/2022  Date of Encounter Visit: 22   LOS: 6 days   Patient Care Team:  Betsy Vega APRN as PCP - General (Family Medicine)    Chief Complaint:consulted for pneumonia    Hospital course: no more dyspnea or chest pain, currently on RA, he did have the CT which was reviewed, no PE with bibasilar pneumonia   Tmax 101.4 which is a trend down  On cefepime  No more chills  Still on room air    REVIEW OF SYSTEMS:   CONSTITUTIONAL:  positive fever and chills  CARDIOVASCULAR: No chest pain, chest pressure or chest discomfort. No palpitations or edema.   RESPIRATORY: Minimal cough  GASTROINTESTINAL: No anorexia, nausea, vomiting or diarrhea. No abdominal pain or blood.   HEMATOLOGIC: No bleeding or bruising.     Ventilator/Non-Invasive Ventilation Settings (From admission, onward)            None            Vital Signs  Temp:  [97.6 °F (36.4 °C)-101.4 °F (38.6 °C)] 97.8 °F (36.6 °C)  Heart Rate:  [70-84] 80  Resp:  [16-18] 16  BP: (109-118)/(70-75) 109/70  SpO2:  [92 %-100 %] 96 %  on    Device (Oxygen Therapy): room air    Intake/Output Summary (Last 24 hours) at 2022 1836  Last data filed at 2022 1748  Gross per 24 hour   Intake 240 ml   Output --   Net 240 ml     Flowsheet Rows    Flowsheet Row First Filed Value   Admission Height 165.1 cm (65\") Documented at 2022 0552   Admission Weight 72.6 kg (160 lb) Documented at 2022 0552        Body mass index is 27.46 kg/m².      22  0552 22  1025   Weight: 72.6 kg (160 lb) 74.8 kg (164 lb 12.8 oz)       Physical Exam:  GEN:  No acute distress, alert, cooperative, well developed   EYES:   Sclerae clear. No icterus. PERRL. Normal EOM  ENT:   External ears/nose normal, no oral lesions, no thrush, mucous membranes moist  NECK:  Supple, midline trachea, no JVD  LUNGS: Normal chest on inspection, very minimal residual crackles, " no wheezes, breathing was nonlabored on room air  CV:  Regular rhythm and rate. Normal S1/S2. No murmurs, gallops, or rubs noted.  ABD:  Soft, nontender and nondistended. Normal bowel sounds. No guarding  EXT:  Moves all extremities well. No cyanosis. No redness. No edema.   Skin: Dry, intact, no bleeding    Results Review:    Results From Last 14 Days   Lab Units 03/28/22  0749 03/27/22  0936 03/26/22  0900   D DIMER QUANT MCGFEU/mL 2.93*  --   --    LACTATE mmol/L  --  1.1 0.9     Results from last 7 days   Lab Units 04/01/22  0637 03/31/22  2310 03/31/22  0759 03/30/22  0750 03/29/22  0708 03/28/22  0400 03/27/22  2157 03/27/22  0936 03/26/22  0735   SODIUM mmol/L 135*  --  138 141 146* 143  --  140 138   POTASSIUM mmol/L 4.3 4.4 3.4* 4.4 4.3 4.1 4.1 3.4* 3.5   CHLORIDE mmol/L 104  --  103 106 103 113*  --  111* 105   CO2 mmol/L 20.3*  --  24.0 25.0 19.7* 17.1*  --  18.0* 18.2*   BUN mg/dL 10  --  12 12 10 11  --  13 13   CREATININE mg/dL 0.76  --  0.93 0.87 0.90 0.80  --  0.97 0.93   CALCIUM mg/dL 9.1  --  8.3* 8.9 8.5* 7.6*  --  7.6* 8.3*   AST (SGOT) U/L  --   --  34  --   --   --   --  15 18   ALT (SGPT) U/L  --   --  38  --   --   --   --  19 22   ANION GAP mmol/L 10.7  --  11.0 10.0 23.3* 12.9  --  11.0 14.8   ALBUMIN g/dL  --   --  3.40*  --   --   --   --  3.20* 3.60     Results from last 7 days   Lab Units 03/28/22  0749   TROPONIN T ng/mL <0.010   D DIMER QUANT MCGFEU/mL 2.93*         Results from last 7 days   Lab Units 03/28/22  0749   PROBNP pg/mL 2,622.0*     Results from last 7 days   Lab Units 04/01/22  0637 03/31/22  0759 03/30/22  0750 03/29/22  0708 03/28/22  0400 03/27/22  1859 03/26/22  0735   WBC 10*3/mm3 8.12 5.49 7.18 9.70 9.56 14.83* 17.65*   HEMOGLOBIN g/dL 13.7 12.6* 13.4 12.4* 11.5* 14.9 13.5   HEMATOCRIT % 41.0 38.4 40.3 37.6 34.5* 42.7 41.2   PLATELETS 10*3/mm3 231 160 165 155 127* 132* 129*   MCV fL 85.4 86.1 84.8 84.7 85.2 83.6 87.5   NEUTROPHIL % % 74.8 65.2 66.2 75.7 77.6*  --   84.6*   LYMPHOCYTE % % 13.1* 17.3* 17.0* 11.4* 11.2*  --  4.2*   MONOCYTES % % 8.0 12.4* 12.5* 11.4 9.9  --  8.7   EOSINOPHIL % % 2.1 2.6 2.9 0.6 0.6  --  0.1*   BASOPHIL % % 0.6 0.5 0.3 0.2 0.3  --  0.2   IMM GRAN % % 1.4* 2.0* 1.1* 0.7* 0.4  --  2.2*         Results from last 7 days   Lab Units 03/27/22  0936   MAGNESIUM mg/dL 1.9           Invalid input(s): LDLCALC      Results from last 7 days   Lab Units 03/26/22  0735   HEMOGLOBIN A1C % 8.80*     Glucose   Date/Time Value Ref Range Status   04/01/2022 1612 287 (H) 70 - 130 mg/dL Final     Comment:     Meter: [id number] : [id number] [facility]   04/01/2022 1131 184 (H) 70 - 130 mg/dL Final     Comment:     Meter: [id number] : [id number] [facility]   04/01/2022 0602 180 (H) 70 - 130 mg/dL Final     Comment:     Meter: [id number] : [id number] Rebecca Vincent    03/31/2022 1929 198 (H) 70 - 130 mg/dL Final     Comment:     Meter: [id number] : [id number] Rebecca Vincent    03/31/2022 1625 223 (H) 70 - 130 mg/dL Final     Comment:     Meter: [id number] : [id number] [facility]   03/31/2022 1142 261 (H) 70 - 130 mg/dL Final     Comment:     Meter: [id number] : [id number] [facility]   03/31/2022 0611 268 (H) 70 - 130 mg/dL Final     Comment:     Meter: [id number] : [id number] Rebecca Subramanianyla    03/30/2022 2022 243 (H) 70 - 130 mg/dL Final     Comment:     Meter: [id number] : [id number] Rebecca CONNELLY     Results from last 7 days   Lab Units 03/30/22  0750 03/28/22  0749 03/27/22  0936 03/26/22  0900 03/26/22  0735   PROCALCITONIN ng/mL 2.48* 3.27*  --   --  0.87*   LACTATE mmol/L  --   --  1.1 0.9  --      Results from last 7 days   Lab Units 03/30/22  1505 03/30/22  1504 03/30/22  1046 03/26/22  1826 03/26/22  0900   BLOODCX  No growth at 2 days No growth at 2 days  --   --  No growth at 5 days  No growth at 5 days   RESPCX   --   --  Scant growth (1+) Normal respiratory tian. No S. aureus or Pseudomonas  aeruginosa detected. Final report.  --   --    STREP PNEUMO AG   --   --   --  Negative  --    L. PNEUMOPHILA SEROGP 1 UR AG   --   --   --  Negative  --      Results from last 7 days   Lab Units 03/31/22  1517   NITRITE UA  Negative     Results from last 7 days   Lab Units 03/31/22  1514 03/26/22  1437   COVID19  Not Detected Not Detected   ADENOVIRUS DETECTION BY PCR   --  Not Detected   CORONAVIRUS 229E   --  Not Detected   CORONAVIRUS HKU1   --  Not Detected   CORONAVIRUS NL63   --  Not Detected   CORONAVIRUS OC43   --  Not Detected   HUMAN METAPNEUMOVIRUS   --  Not Detected   HUMAN RHINOVIRUS/ENTEROVIRUS   --  Not Detected   INFLUENZA B PCR   --  Not Detected   PARAINFLUENZA 1   --  Not Detected   PARAINFLUENZA VIRUS 2   --  Not Detected   PARAINFLUENZA VIRUS 3   --  Not Detected   PARAINFLUENZA VIRUS 4   --  Not Detected   BORDETELLA PERTUSSIS PCR   --  Not Detected   BORDETELLA PARAPERTUSSIS PCR   --  Not Detected   CHLAMYDOPHILA PNEUMONIAE PCR   --  Not Detected   MYCOPLAMA PNEUMO PCR   --  Not Detected   RSV, PCR   --  Not Detected               Imaging:   Imaging Results (All)      I reviewed the patient's new clinical results.  I personally viewed and interpreted the patient's imaging results:        Medication Review:   acetaminophen, 650 mg, Oral, TID  cefepime, 2 g, Intravenous, Q8H  cetirizine, 10 mg, Oral, Daily  enoxaparin, 40 mg, Subcutaneous, Q24H  insulin lispro, 0-9 Units, Subcutaneous, TID AC  ipratropium-albuterol, 3 mL, Nebulization, 4x Daily - RT  pregabalin, 300 mg, Oral, BID  sodium chloride, 10 mL, Intravenous, Q12H  tamsulosin, 0.4 mg, Oral, Daily             ASSESSMENT:   1. Community-acquired pneumonia  2. Acute hypoxemic respiratory failure, transient  3. Diabetes mellitus with diabetic nephropathy  4. Essential hypertension  5. Mild thrombocytopenia  6. History of tuberculosis treated adequately in 2015    PLAN:  Fever spikes are trending down  Patient is responding clinically and  patient is responding radiologically.  Patient failed to mention that he did have tuberculosis that was treated in 2015, but the location of the infiltrate is atypical and the patient is doing better clinically so we will continue to follow.  Infectious disease are aware, no indication for isolation at this point  We will hold on the bronchoscopy given the clinical improvement and finish the course of the antibiotic as ordered unless patient spikes another high-grade fever despite the above    Discussed with Dr. dey    Disposition: Per primary team    Kimberly Reynolds MD  04/01/22  18:35 EDT       Dictated utilizing Dragon dictation

## 2022-04-01 NOTE — PLAN OF CARE
Goal Outcome Evaluation:  Plan of Care Reviewed With: patient, daughter        Progress: no change  Outcome Evaluation: A&Ox4. RA.  used throughout shift. Regular, consistent carb diet ordered. IV antibiotics. Family remians at bedside. Fever this am, Tylenol given per MAR. C/o pain in right arm, cold compress given, pt stated improvement. ACHS, insulin given per MAR. VSS. Will continue to monitor.

## 2022-04-01 NOTE — PROGRESS NOTES
"  [facility] Progress Note    Chief Complaint: None    Doing well.  Anxious to go home.  I talked to the patient via one of his family members who speaks excellent English.  He was started on Flomax today.  He is noticed his voiding stream is improved a little bit.  A little bit less frequency but nothing profound.  We did talk about his CT findings again and the changes of his bladder.    Review of Systems:    The following systems were reviewed and negative;  respiratory, cardiovascular and gastrointestinal          Vital Signs  /70 (BP Location: Right arm, Patient Position: Lying)   Pulse 80   Temp 97.8 °F (36.6 °C) (Oral)   Resp 16   Ht 165 cm (64.96\")   Wt 74.8 kg (164 lb 12.8 oz)   SpO2 96%   BMI 27.46 kg/m²     Physical Exam:     General Appearance:    Alert, cooperative, NAD   HEENT:    No trauma, pupils reactive, hearing intact   Back:     No CVA tenderness   Lungs:     Respirations unlabored, no wheezing    Heart:    RRR, intact peripheral pulses   Abdomen:     Soft, NDNT, no masses, no guarding   :   Phallus normal testes normal   Extremities:   No edema, no deformity   Lymphatic:   No neck or groin LAD   Skin:   No bleeding, bruising or rashes   Neuro/Psych:   Orientation intact, mood/affect pleasant, no focal findings        Results Review:     I reviewed the patient's new clinical results.  Lab Results (last 24 hours)     Procedure Component Value Units Date/Time    POC Glucose Once [[id number]]  (Abnormal) Collected: 04/01/22 1612    Specimen: Blood Updated: 04/01/22 1613     Glucose 287 mg/dL      Comment: Meter: [id number] : [id number] Sharona CONNELLY       Blood Culture - Blood, Arm, Left [[id number]]  (Normal) Collected: 03/30/22 1505    Specimen: Blood from Arm, Left Updated: 04/01/22 1532     Blood Culture No growth at 2 days    Blood Culture - Blood, Hand, Left [[id number]]  (Normal) Collected: 03/30/22 1504    Specimen: Blood from Hand, Left Updated: 04/01/22 1532     Blood " Culture No growth at 2 days    POC Glucose Once [[id number]]  (Abnormal) Collected: 04/01/22 1131    Specimen: Blood Updated: 04/01/22 1134     Glucose 184 mg/dL      Comment: Meter: [id number] : [id number] Sharona CONNELLY       Respiratory Culture - Sputum, Cough [[id number]] Collected: 03/30/22 1046    Specimen: Sputum from Cough Updated: 04/01/22 0937     Respiratory Culture Scant growth (1+) Normal respiratory tian. No S. aureus or Pseudomonas aeruginosa detected. Final report.     Gram Stain Few (2+) WBCs seen      Less than 10 Epithelial cells seen      Few (2+) Gram positive cocci in chains      Moderate (3+) Gram negative bacilli      Few (2+) Gram positive bacilli    Basic Metabolic Panel [[id number]]  (Abnormal) Collected: 04/01/22 0637    Specimen: Blood Updated: 04/01/22 0739     Glucose 193 mg/dL      BUN 10 mg/dL      Creatinine 0.76 mg/dL      Sodium 135 mmol/L      Potassium 4.3 mmol/L      Chloride 104 mmol/L      CO2 20.3 mmol/L      Calcium 9.1 mg/dL      BUN/Creatinine Ratio 13.2     Anion Gap 10.7 mmol/L      eGFR 102.3 mL/min/1.73      Comment: National Kidney Foundation and American Society of Nephrology (ASN) Task Force recommended calculation based on the Chronic Kidney Disease Epidemiology Collaboration (CKD-EPI) equation refit without adjustment for race.       Narrative:      GFR Normal >60  Chronic Kidney Disease <60  Kidney Failure <15      CBC & Differential [[id number]]  (Abnormal) Collected: 04/01/22 0637    Specimen: Blood Updated: 04/01/22 0715    Narrative:      The following orders were created for panel order CBC & Differential.  Procedure                               Abnormality         Status                     ---------                               -----------         ------                     CBC Auto Differential[[id number]]        Abnormal            Final result                 Please view results for these tests on the individual orders.    CBC Auto Differential  [[id number]]  (Abnormal) Collected: 04/01/22 0637    Specimen: Blood Updated: 04/01/22 0715     WBC 8.12 10*3/mm3      RBC 4.80 10*6/mm3      Hemoglobin 13.7 g/dL      Hematocrit 41.0 %      MCV 85.4 fL      MCH 28.5 pg      MCHC 33.4 g/dL      RDW 12.8 %      RDW-SD 39.4 fl      MPV 10.2 fL      Platelets 231 10*3/mm3      Neutrophil % 74.8 %      Lymphocyte % 13.1 %      Monocyte % 8.0 %      Eosinophil % 2.1 %      Basophil % 0.6 %      Immature Grans % 1.4 %      Neutrophils, Absolute 6.08 10*3/mm3      Lymphocytes, Absolute 1.06 10*3/mm3      Monocytes, Absolute 0.65 10*3/mm3      Eosinophils, Absolute 0.17 10*3/mm3      Basophils, Absolute 0.05 10*3/mm3      Immature Grans, Absolute 0.11 10*3/mm3      nRBC 0.1 /100 WBC     POC Glucose Once [[id number]]  (Abnormal) Collected: 04/01/22 0602    Specimen: Blood Updated: 04/01/22 0603     Glucose 180 mg/dL      Comment: Meter: [id number] : [id number] Rebecca CONNELLY       Potassium [[id number]]  (Normal) Collected: 03/31/22 2310    Specimen: Blood Updated: 04/01/22 0002     Potassium 4.4 mmol/L     POC Glucose Once [[id number]]  (Abnormal) Collected: 03/31/22 1929    Specimen: Blood Updated: 03/31/22 1930     Glucose 198 mg/dL      Comment: Meter: [id number] : [id number] Rebecca CONNELLY           Imaging Results (Last 24 Hours)     ** No results found for the last 24 hours. **          Medication Review:   I have personally reviewed    Current Facility-Administered Medications:   •  acetaminophen (TYLENOL) tablet 650 mg, 650 mg, Oral, Q4H PRN, 650 mg at 03/30/22 1340 **OR** acetaminophen (TYLENOL) 160 MG/5ML solution 650 mg, 650 mg, Oral, Q4H PRN **OR** acetaminophen (TYLENOL) suppository 650 mg, 650 mg, Rectal, Q4H PRN, Lana Tran, JM  •  acetaminophen (TYLENOL) tablet 650 mg, 650 mg, Oral, TID, Calderon Yoo MD, 650 mg at 04/01/22 1524  •  benzonatate (TESSALON) capsule 100 mg, 100 mg, Oral, TID PRN, Lana Tran APRN, 100 mg at  03/31/22 0841  •  cefepime 2 gm IVPB in 100 ml NS (VTB), 2 g, Intravenous, Q8H, Lana Rudd DO, 2 g at 04/01/22 1817  •  cetirizine (zyrTEC) tablet 10 mg, 10 mg, Oral, Daily, Lana Tran APRN, 10 mg at 03/31/22 0841  •  dextrose (D50W) (25 g/50 mL) IV injection 25 g, 25 g, Intravenous, Q15 Min PRN, Lana Tran APRN  •  dextrose (GLUTOSE) oral gel 15 g, 15 g, Oral, Q15 Min PRN, Lana Tran APRN  •  enoxaparin (LOVENOX) syringe 40 mg, 40 mg, Subcutaneous, Q24H, Calderon Yoo MD, 40 mg at 04/01/22 1817  •  glucagon (human recombinant) (GLUCAGEN DIAGNOSTIC) injection 1 mg, 1 mg, Subcutaneous, PRN, Lana Tran APRN  •  guaifenesin-dextromethorphan (MUCINEX DM) tablet 2 tablet, 2 tablet, Oral, BID PRN, Lana Rudd DO  •  HYDROcod Polst-CPM Polst ER (TUSSIONEX PENNKINETIC) 10-8 MG/5ML ER suspension 5 mL, 5 mL, Oral, Q12H PRN, Calderon Yoo MD, 5 mL at 04/01/22 1718  •  insulin lispro (ADMELOG) injection 0-9 Units, 0-9 Units, Subcutaneous, TID AC, Lana Tran APRN, 6 Units at 04/01/22 1718  •  ipratropium-albuterol (DUO-NEB) nebulizer solution 3 mL, 3 mL, Nebulization, 4x Daily - RT, Lana Tran APRN, 3 mL at 04/01/22 1513  •  ipratropium-albuterol (DUO-NEB) nebulizer solution 3 mL, 3 mL, Nebulization, Q2H PRN, Manny Proctor MD  •  Magnesium Sulfate 2 gram Bolus, followed by 8 gram infusion (total Mg dose 10 grams)- Mg less than or equal to 1mg/dL, 2 g, Intravenous, PRN **OR** Magnesium Sulfate 2 gram / 50mL Infusion (GIVE X 3 BAGS TO EQUAL 6GM TOTAL DOSE) - Mg 1.1 - 1.5 mg/dl, 2 g, Intravenous, PRN **OR** Magnesium Sulfate 4 gram infusion- Mg 1.6-1.9 mg/dL, 4 g, Intravenous, PRN, Lana Rudd DO, Last Rate: 25 mL/hr at 03/27/22 1540, 4 g at 03/27/22 1540  •  nitroglycerin (NITROSTAT) SL tablet 0.4 mg, 0.4 mg, Sublingual, Q5 Min PRN, Lana Tran, APRN  •  ondansetron (ZOFRAN) tablet 4 mg, 4 mg, Oral, Q6H PRN **OR** ondansetron  (ZOFRAN) injection 4 mg, 4 mg, Intravenous, Q6H PRN, Tran, Lana M, APRN  •  potassium chloride (K-DUR,KLOR-CON) ER tablet 40 mEq, 40 mEq, Oral, PRN, TobinLana, DO, 40 mEq at 03/31/22 1656  •  potassium chloride (KLOR-CON) packet 40 mEq, 40 mEq, Oral, PRN, TobinKamLana, DO  •  potassium phosphate 45 mmol in sodium chloride 0.9 % 500 mL infusion, 45 mmol, Intravenous, PRN **OR** potassium phosphate 30 mmol in sodium chloride 0.9 % 250 mL infusion, 30 mmol, Intravenous, PRN **OR** potassium phosphate 15 mmol in sodium chloride 0.9 % 100 mL infusion, 15 mmol, Intravenous, PRN, 15 mmol at 03/27/22 1801 **OR** sodium phosphates 40 mmol in sodium chloride 0.9 % 500 mL IVPB, 40 mmol, Intravenous, PRN **OR** sodium phosphates 20 mmol in sodium chloride 0.9 % 250 mL IVPB, 20 mmol, Intravenous, PRN, Kam Ruddine, DO  •  pregabalin (LYRICA) capsule 300 mg, 300 mg, Oral, BID, Tobin, Lana, DO, 300 mg at 03/31/22 2049  •  [COMPLETED] Insert peripheral IV, , , Once **AND** sodium chloride 0.9 % flush 10 mL, 10 mL, Intravenous, PRN, Earle Ling PA  •  sodium chloride 0.9 % flush 10 mL, 10 mL, Intravenous, Q12H, Afsaneh Tranherine M, APRN, 10 mL at 04/01/22 0856  •  sodium chloride 0.9 % flush 10 mL, 10 mL, Intravenous, PRN, Tran, Lana M, APRN  •  tamsulosin (FLOMAX) 24 hr capsule 0.4 mg, 0.4 mg, Oral, Daily, Boby Ledezma MD, 0.4 mg at 03/30/22 2203    Allergies:    Patient has no known allergies.    Assessment:    Active Problems:  Patient Active Problem List   Diagnosis   • Community acquired pneumonia, unspecified laterality   • DM (diabetes mellitus), type 2 (HCC)   • HTN (hypertension)   • GERD without esophagitis   • Thrombocytopenia (HCC)   • Fever       BPH with bladder outlet obstruction with CT demonstration of bladder thickening and trabeculation.    Plan:    Discharge on tamsulosin and plan for outpatient cystoscopy in our office in the next several weeks.  We will try to  contact the patient but the patient will be supplied our office numbers well.  Call if needed during this admission.      Boby Ledezma MD    4/1/2022  18:46 EDT

## 2022-04-02 LAB
GLUCOSE BLDC GLUCOMTR-MCNC: 232 MG/DL (ref 70–130)
GLUCOSE BLDC GLUCOMTR-MCNC: 240 MG/DL (ref 70–130)
GLUCOSE BLDC GLUCOMTR-MCNC: 243 MG/DL (ref 70–130)
GLUCOSE BLDC GLUCOMTR-MCNC: 420 MG/DL (ref 70–130)
PROCALCITONIN SERPL-MCNC: 0.91 NG/ML (ref 0–0.25)

## 2022-04-02 PROCEDURE — 94799 UNLISTED PULMONARY SVC/PX: CPT

## 2022-04-02 PROCEDURE — 25010000002 ENOXAPARIN PER 10 MG: Performed by: HOSPITALIST

## 2022-04-02 PROCEDURE — 25010000002 CEFEPIME PER 500 MG: Performed by: STUDENT IN AN ORGANIZED HEALTH CARE EDUCATION/TRAINING PROGRAM

## 2022-04-02 PROCEDURE — 82962 GLUCOSE BLOOD TEST: CPT

## 2022-04-02 PROCEDURE — 63710000001 INSULIN LISPRO (HUMAN) PER 5 UNITS: Performed by: NURSE PRACTITIONER

## 2022-04-02 PROCEDURE — 84145 PROCALCITONIN (PCT): CPT | Performed by: HOSPITALIST

## 2022-04-02 RX ADMIN — ACETAMINOPHEN 650 MG: 325 TABLET ORAL at 08:09

## 2022-04-02 RX ADMIN — CETIRIZINE HYDROCHLORIDE 10 MG: 10 TABLET ORAL at 08:09

## 2022-04-02 RX ADMIN — CEFEPIME HYDROCHLORIDE 2 G: 2 INJECTION, POWDER, FOR SOLUTION INTRAVENOUS at 02:04

## 2022-04-02 RX ADMIN — Medication 10 ML: at 20:06

## 2022-04-02 RX ADMIN — ACETAMINOPHEN 650 MG: 325 TABLET, FILM COATED ORAL at 20:06

## 2022-04-02 RX ADMIN — BENZONATATE 100 MG: 100 CAPSULE ORAL at 08:15

## 2022-04-02 RX ADMIN — Medication 10 ML: at 08:15

## 2022-04-02 RX ADMIN — PREGABALIN 300 MG: 100 CAPSULE ORAL at 20:06

## 2022-04-02 RX ADMIN — ENOXAPARIN SODIUM 40 MG: 100 INJECTION SUBCUTANEOUS at 20:05

## 2022-04-02 RX ADMIN — IPRATROPIUM BROMIDE AND ALBUTEROL SULFATE 3 ML: 2.5; .5 SOLUTION RESPIRATORY (INHALATION) at 10:32

## 2022-04-02 RX ADMIN — TAMSULOSIN HYDROCHLORIDE 0.4 MG: 0.4 CAPSULE ORAL at 08:09

## 2022-04-02 RX ADMIN — PREGABALIN 300 MG: 100 CAPSULE ORAL at 08:08

## 2022-04-02 RX ADMIN — INSULIN LISPRO 4 UNITS: 100 INJECTION, SOLUTION INTRAVENOUS; SUBCUTANEOUS at 08:09

## 2022-04-02 RX ADMIN — CEFEPIME HYDROCHLORIDE 2 G: 2 INJECTION, POWDER, FOR SOLUTION INTRAVENOUS at 10:24

## 2022-04-02 RX ADMIN — INSULIN LISPRO 4 UNITS: 100 INJECTION, SOLUTION INTRAVENOUS; SUBCUTANEOUS at 11:48

## 2022-04-02 RX ADMIN — BENZONATATE 100 MG: 100 CAPSULE ORAL at 21:43

## 2022-04-02 RX ADMIN — HYDROCODONE POLISTIREX AND CHLORPHENIRAMINE POLISITREX 5 ML: 10; 8 SUSPENSION, EXTENDED RELEASE ORAL at 20:05

## 2022-04-02 RX ADMIN — INSULIN LISPRO 4 UNITS: 100 INJECTION, SOLUTION INTRAVENOUS; SUBCUTANEOUS at 21:36

## 2022-04-02 RX ADMIN — INSULIN LISPRO 9 UNITS: 100 INJECTION, SOLUTION INTRAVENOUS; SUBCUTANEOUS at 17:42

## 2022-04-02 RX ADMIN — CEFEPIME HYDROCHLORIDE 2 G: 2 INJECTION, POWDER, FOR SOLUTION INTRAVENOUS at 17:42

## 2022-04-02 NOTE — PROGRESS NOTES
[facility]     LOS: 7 days     Subjective:    CC:Shortness of Breath, Fever, and Cough    DIET:  Diet Order   Procedures   • Diet Regular; Consistent Carbohydrate   No more temperature spikes overnight, tolerating good oral intake.  Discussed with the patient through his daughters who acted as interpreters    Objective:    Vital Signs:  Temp:  [97.8 °F (36.6 °C)-98.9 °F (37.2 °C)] 97.8 °F (36.6 °C)  Heart Rate:  [] 81  Resp:  [17-18] 18  BP: (104-111)/(59-76) 111/76    SpO2:  [95 %-98 %] 95 %  on   ;   Device (Oxygen Therapy): room air  Body mass index is 27.46 kg/m².    Physical Exam  Constitutional:       Appearance: Normal appearance.   HENT:      Head: Normocephalic and atraumatic.   Cardiovascular:      Rate and Rhythm: Normal rate and regular rhythm.      Heart sounds: No murmur heard.    No friction rub.   Pulmonary:      Effort: Pulmonary effort is normal.      Breath sounds: Normal breath sounds.   Abdominal:      General: Bowel sounds are normal. There is no distension.      Palpations: Abdomen is soft.      Tenderness: There is no abdominal tenderness.   Skin:     General: Skin is warm and dry.   Neurological:      Mental Status: He is alert.   Psychiatric:         Mood and Affect: Mood normal.         Behavior: Behavior normal.         Results Review:    Glucose   Date Value Ref Range Status   04/01/2022 193 (H) 65 - 99 mg/dL Final   03/31/2022 244 (H) 65 - 99 mg/dL Final     Results from last 7 days   Lab Units 04/01/22  0637   WBC 10*3/mm3 8.12   HEMOGLOBIN g/dL 13.7   HEMATOCRIT % 41.0   PLATELETS 10*3/mm3 231     Results from last 7 days   Lab Units 04/01/22  0637 03/31/22  2310 03/31/22  0759   SODIUM mmol/L 135*  --  138   POTASSIUM mmol/L 4.3   < > 3.4*   CHLORIDE mmol/L 104  --  103   CO2 mmol/L 20.3*  --  24.0   BUN mg/dL 10  --  12   CREATININE mg/dL 0.76  --  0.93   CALCIUM mg/dL 9.1  --  8.3*   BILIRUBIN mg/dL  --   --  0.4   ALK PHOS U/L  --   --  177*    ALT (SGPT) U/L  --   --  38   AST (SGOT) U/L  --   --  34   GLUCOSE mg/dL 193*  --  244*    < > = values in this interval not displayed.         Results from last 7 days   Lab Units 03/27/22  0936   MAGNESIUM mg/dL 1.9     Results from last 7 days   Lab Units 03/28/22  0749   TROPONIN T ng/mL <0.010     Cultures:  Blood Culture   Date Value Ref Range Status   03/26/2022 No growth at 2 days  Preliminary   03/26/2022 No growth at 2 days  Preliminary       I have reviewed daily medications and changes in CPOE    Scheduled meds  cefepime, 2 g, Intravenous, Q8H  cetirizine, 10 mg, Oral, Daily  enoxaparin, 40 mg, Subcutaneous, Q24H  insulin lispro, 0-9 Units, Subcutaneous, 4x Daily With Meals & Nightly  ipratropium-albuterol, 3 mL, Nebulization, 4x Daily - RT  pregabalin, 300 mg, Oral, BID  sodium chloride, 10 mL, Intravenous, Q12H  tamsulosin, 0.4 mg, Oral, Daily           PRN meds  •  acetaminophen **OR** acetaminophen **OR** acetaminophen  •  benzonatate  •  dextrose  •  dextrose  •  glucagon (human recombinant)  •  guaifenesin-dextromethorphan  •  HYDROcod Polst-CPM Polst ER  •  ipratropium-albuterol  •  magnesium sulfate **OR** magnesium sulfate **OR** magnesium sulfate  •  nitroglycerin  •  ondansetron **OR** ondansetron  •  potassium chloride  •  potassium chloride  •  potassium phosphate infusion greater than 15 mMoles **OR** potassium phosphate infusion greater than 15 mMoles **OR** potassium phosphate **OR** sodium phosphate IVPB **OR** sodium phosphate IVPB  •  [COMPLETED] Insert peripheral IV **AND** sodium chloride  •  sodium chloride        Community acquired pneumonia, unspecified laterality    DM (diabetes mellitus), type 2 (HCC)    HTN (hypertension)    GERD without esophagitis    Thrombocytopenia (HCC)    Fever        Assessment/Plan:  62-year-old male with a history of diabetes who presented with fever and nonproductive cough, worsening for last week.  He failed outpatient course of antibiotics.   Chest x-ray shows left basilar infiltrate.  He is admitted for community-acquired pneumonia.      Today: No more spikes, feeling much better    Community-acquired pneumonia, with sepsis on admission:   -On cefepime through tomorrow.   Strep pneumo and Legionella urine antigens, respiratory viral panel negative, and MRSA nares negative  -Follow-up blood cultures negative so far      Possibly has component of urinary tract infection  -Will need follow-up for this with urology outpatient, at least will need follow-up scope  -check a urinalysis    Type 2 diabetes.  A1c 8.8:   -Hold home oral medications.  Sliding scale insulin for now      Thrombocytopenia: Mild on admission, possibly related to acute illness.    platelets have normalized     · SCDs for DVT prophylaxis.    Lovenox for DVT prophylaxis  · Full code.  · Discussed with patient and family   · Anticipate discharge home, tomorrow after chest x-ray and final dose of cefepime          Calderon Yoo MD  04/02/22  18:53 EDT

## 2022-04-02 NOTE — PLAN OF CARE
Goal Outcome Evaluation: patient denies chest pain and appetite is fair.  Denies chest pain. No coughing. Continues on IV abt. Patient BG level 420 at dinner time. MD notified and this RN to administer insulin per protocol. Family brings in food and drinks for the patient and it is all that he will eat and drink; they deny that they are giving him anything that would cause a spike in his blood glucose levels.  Safety precautions maintained and call light is within reach.  WCTM.

## 2022-04-02 NOTE — PLAN OF CARE
"Goal Outcome Evaluation:         Night time dose of insulin obtained for glucose of 324 after dinner. Patient stated that the Tussionex given to him at the end of day shift made him feel like he had a \"hangover\". No complaints of pain. IV abx continued. Urinating regularly. VSS. Family at bedside.         "

## 2022-04-02 NOTE — PROGRESS NOTES
"  PROGRESS NOTE  Patient Name: Saul Berrios  Age/Sex: 62 y.o. male  : 1960  MRN: [id number]    Date of Admission: 3/26/2022  Date of Encounter Visit: 22   LOS: 7 days   Patient Care Team:  Betsy Vega APRN as PCP - General (Family Medicine)    Chief Complaint:consulted for pneumonia    Hospital course: no more dyspnea dyspnea or chest pain  No significant cough  No more fever  On cefepime    REVIEW OF SYSTEMS:   CONSTITUTIONAL: No more fever or chills  CARDIOVASCULAR: No chest pain, chest pressure or chest discomfort. No palpitations or edema.   RESPIRATORY: Nearly resolved cough  GASTROINTESTINAL: No anorexia, nausea, vomiting or diarrhea. No abdominal pain or blood.   HEMATOLOGIC: No bleeding or bruising.     Ventilator/Non-Invasive Ventilation Settings (From admission, onward)            None            Vital Signs  Temp:  [97.8 °F (36.6 °C)-98.9 °F (37.2 °C)] 98.9 °F (37.2 °C)  Heart Rate:  [] 65  Resp:  [16-18] 18  BP: (104-109)/(59-70) 104/65  SpO2:  [94 %-98 %] 95 %  on    Device (Oxygen Therapy): room air    Intake/Output Summary (Last 24 hours) at 2022 1317  Last data filed at 2022 0907  Gross per 24 hour   Intake 656 ml   Output --   Net 656 ml     Flowsheet Rows    Flowsheet Row First Filed Value   Admission Height 165.1 cm (65\") Documented at 2022 0552   Admission Weight 72.6 kg (160 lb) Documented at 2022 0552        Body mass index is 27.46 kg/m².      22  0552 22  1025   Weight: 72.6 kg (160 lb) 74.8 kg (164 lb 12.8 oz)       Physical Exam:  GEN:  No acute distress, alert, cooperative, well developed   EYES:   Sclerae clear. No icterus. PERRL. Normal EOM  ENT:   External ears/nose normal, no oral lesions, no thrush, mucous membranes moist  NECK:  Supple, midline trachea, no JVD  LUNGS: Normal chest on inspection, still persistent crackles over the bases with good breath sounds bilaterally  CV:  Regular rhythm and rate. Normal S1/S2. No murmurs, " gallops, or rubs noted.  ABD:  Soft, nontender and nondistended. Normal bowel sounds. No guarding  EXT:  Moves all extremities well. No cyanosis. No redness. No edema.   Skin: Dry, intact, no bleeding    Results Review:    Results From Last 14 Days   Lab Units 03/28/22  0749 03/27/22  0936 03/26/22  0900   D DIMER QUANT MCGFEU/mL 2.93*  --   --    LACTATE mmol/L  --  1.1 0.9     Results from last 7 days   Lab Units 04/01/22  0637 03/31/22  2310 03/31/22  0759 03/30/22  0750 03/29/22  0708 03/28/22  0400 03/27/22  2157 03/27/22  0936   SODIUM mmol/L 135*  --  138 141 146* 143  --  140   POTASSIUM mmol/L 4.3 4.4 3.4* 4.4 4.3 4.1 4.1 3.4*   CHLORIDE mmol/L 104  --  103 106 103 113*  --  111*   CO2 mmol/L 20.3*  --  24.0 25.0 19.7* 17.1*  --  18.0*   BUN mg/dL 10  --  12 12 10 11  --  13   CREATININE mg/dL 0.76  --  0.93 0.87 0.90 0.80  --  0.97   CALCIUM mg/dL 9.1  --  8.3* 8.9 8.5* 7.6*  --  7.6*   AST (SGOT) U/L  --   --  34  --   --   --   --  15   ALT (SGPT) U/L  --   --  38  --   --   --   --  19   ANION GAP mmol/L 10.7  --  11.0 10.0 23.3* 12.9  --  11.0   ALBUMIN g/dL  --   --  3.40*  --   --   --   --  3.20*     Results from last 7 days   Lab Units 03/28/22  0749   TROPONIN T ng/mL <0.010   D DIMER QUANT MCGFEU/mL 2.93*         Results from last 7 days   Lab Units 03/28/22  0749   PROBNP pg/mL 2,622.0*     Results from last 7 days   Lab Units 04/01/22  0637 03/31/22  0759 03/30/22  0750 03/29/22  0708 03/28/22  0400 03/27/22  1859   WBC 10*3/mm3 8.12 5.49 7.18 9.70 9.56 14.83*   HEMOGLOBIN g/dL 13.7 12.6* 13.4 12.4* 11.5* 14.9   HEMATOCRIT % 41.0 38.4 40.3 37.6 34.5* 42.7   PLATELETS 10*3/mm3 231 160 165 155 127* 132*   MCV fL 85.4 86.1 84.8 84.7 85.2 83.6   NEUTROPHIL % % 74.8 65.2 66.2 75.7 77.6*  --    LYMPHOCYTE % % 13.1* 17.3* 17.0* 11.4* 11.2*  --    MONOCYTES % % 8.0 12.4* 12.5* 11.4 9.9  --    EOSINOPHIL % % 2.1 2.6 2.9 0.6 0.6  --    BASOPHIL % % 0.6 0.5 0.3 0.2 0.3  --    IMM GRAN % % 1.4* 2.0* 1.1*  0.7* 0.4  --          Results from last 7 days   Lab Units 03/27/22  0936   MAGNESIUM mg/dL 1.9           Invalid input(s): LDLCALC          Glucose   Date/Time Value Ref Range Status   04/02/2022 1128 243 (H) 70 - 130 mg/dL Final     Comment:     Meter: [id number] : [id number] Taborfranco Yarbroughjonah NA   04/02/2022 0619 232 (H) 70 - 130 mg/dL Final     Comment:     Meter: [id number] : [id number] Darien Dorado NA   04/01/2022 1924 324 (H) 70 - 130 mg/dL Final     Comment:     Meter: [id number] : [id number] Darien Dorado NA   04/01/2022 1612 287 (H) 70 - 130 mg/dL Final     Comment:     Meter: [id number] : [id number] Sharona Zenonjonah NA   04/01/2022 1131 184 (H) 70 - 130 mg/dL Final     Comment:     Meter: [id number] : [id number] Sharona Zenonjonah NA   04/01/2022 0602 180 (H) 70 - 130 mg/dL Final     Comment:     Meter: [id number] : [id number] Rebecca Vincent NA   03/31/2022 1929 198 (H) 70 - 130 mg/dL Final     Comment:     Meter: [id number] : [id number] Rebecca Hahna NA   03/31/2022 1625 223 (H) 70 - 130 mg/dL Final     Comment:     Meter: [id number] : [id number] Sharona Jaylenanamaria CONNELLY     Results from last 7 days   Lab Units 04/02/22  0727 03/30/22  0750 03/28/22  0749 03/27/22  0936   PROCALCITONIN ng/mL 0.91* 2.48* 3.27*  --    LACTATE mmol/L  --   --   --  1.1     Results from last 7 days   Lab Units 03/30/22  1505 03/30/22  1504 03/30/22  1046 03/26/22  1826   BLOODCX  No growth at 2 days No growth at 2 days  --   --    RESPCX   --   --  Scant growth (1+) Normal respiratory tian. No S. aureus or Pseudomonas aeruginosa detected. Final report.  --    STREP PNEUMO AG   --   --   --  Negative   L. PNEUMOPHILA SEROGP 1 UR AG   --   --   --  Negative     Results from last 7 days   Lab Units 03/31/22  1517   NITRITE UA  Negative     Results from last 7 days   Lab Units 03/31/22  1514 03/26/22  1437   COVID19  Not Detected Not Detected   ADENOVIRUS DETECTION BY PCR   --  Not Detected   CORONAVIRUS 229E   --   Not Detected   CORONAVIRUS HKU1   --  Not Detected   CORONAVIRUS NL63   --  Not Detected   CORONAVIRUS OC43   --  Not Detected   HUMAN METAPNEUMOVIRUS   --  Not Detected   HUMAN RHINOVIRUS/ENTEROVIRUS   --  Not Detected   INFLUENZA B PCR   --  Not Detected   PARAINFLUENZA 1   --  Not Detected   PARAINFLUENZA VIRUS 2   --  Not Detected   PARAINFLUENZA VIRUS 3   --  Not Detected   PARAINFLUENZA VIRUS 4   --  Not Detected   BORDETELLA PERTUSSIS PCR   --  Not Detected   BORDETELLA PARAPERTUSSIS PCR   --  Not Detected   CHLAMYDOPHILA PNEUMONIAE PCR   --  Not Detected   MYCOPLAMA PNEUMO PCR   --  Not Detected   RSV, PCR   --  Not Detected               Imaging:   Imaging Results (All)      I reviewed the patient's new clinical results.  I personally viewed and interpreted the patient's imaging results:        Medication Review:   cefepime, 2 g, Intravenous, Q8H  cetirizine, 10 mg, Oral, Daily  enoxaparin, 40 mg, Subcutaneous, Q24H  insulin lispro, 0-9 Units, Subcutaneous, 4x Daily With Meals & Nightly  ipratropium-albuterol, 3 mL, Nebulization, 4x Daily - RT  pregabalin, 300 mg, Oral, BID  sodium chloride, 10 mL, Intravenous, Q12H  tamsulosin, 0.4 mg, Oral, Daily             ASSESSMENT:   1. Community-acquired pneumonia  2. Acute hypoxemic respiratory failure, transient  3. Diabetes mellitus with diabetic nephropathy  4. Essential hypertension  5. Mild thrombocytopenia  6. History of tuberculosis treated adequately in 2015    PLAN:  No more fever  Good clinical response  On room air for several days  Finish the course of cefepime for the duration recommended by infectious disease  Repeat chest x-ray on Monday to further follow-up on the improvement  Overall looking and feeling better        Discussed with patient    Disposition: Per primary team    Kimberly Reynolds MD  04/02/22  13:17 EDT       Dictated utilizing Dragon dictation

## 2022-04-03 ENCOUNTER — READMISSION MANAGEMENT (OUTPATIENT)
Dept: CALL CENTER | Facility: HOSPITAL | Age: 62
End: 2022-04-03

## 2022-04-03 ENCOUNTER — APPOINTMENT (OUTPATIENT)
Dept: GENERAL RADIOLOGY | Facility: HOSPITAL | Age: 62
End: 2022-04-03

## 2022-04-03 VITALS
DIASTOLIC BLOOD PRESSURE: 61 MMHG | RESPIRATION RATE: 16 BRPM | SYSTOLIC BLOOD PRESSURE: 133 MMHG | HEART RATE: 96 BPM | WEIGHT: 164.8 LBS | HEIGHT: 65 IN | OXYGEN SATURATION: 96 % | TEMPERATURE: 98.7 F | BODY MASS INDEX: 27.46 KG/M2

## 2022-04-03 PROBLEM — E27.8 ADRENAL NODULE (HCC): Status: ACTIVE | Noted: 2022-04-03

## 2022-04-03 PROBLEM — E04.1 THYROID NODULE: Status: ACTIVE | Noted: 2022-04-03

## 2022-04-03 LAB
GLUCOSE BLDC GLUCOMTR-MCNC: 226 MG/DL (ref 70–130)
GLUCOSE BLDC GLUCOMTR-MCNC: 300 MG/DL (ref 70–130)

## 2022-04-03 PROCEDURE — 25010000002 CEFEPIME PER 500 MG: Performed by: STUDENT IN AN ORGANIZED HEALTH CARE EDUCATION/TRAINING PROGRAM

## 2022-04-03 PROCEDURE — 63710000001 INSULIN LISPRO (HUMAN) PER 5 UNITS: Performed by: NURSE PRACTITIONER

## 2022-04-03 PROCEDURE — 71046 X-RAY EXAM CHEST 2 VIEWS: CPT

## 2022-04-03 PROCEDURE — 82962 GLUCOSE BLOOD TEST: CPT

## 2022-04-03 RX ORDER — TAMSULOSIN HYDROCHLORIDE 0.4 MG/1
0.4 CAPSULE ORAL DAILY
Qty: 30 CAPSULE | Refills: 0 | Status: SHIPPED | OUTPATIENT
Start: 2022-04-04 | End: 2022-05-04

## 2022-04-03 RX ORDER — BENZONATATE 100 MG/1
100 CAPSULE ORAL 3 TIMES DAILY PRN
Qty: 10 CAPSULE | Refills: 0 | Status: SHIPPED | OUTPATIENT
Start: 2022-04-03 | End: 2022-04-10

## 2022-04-03 RX ADMIN — CEFEPIME HYDROCHLORIDE 2 G: 2 INJECTION, POWDER, FOR SOLUTION INTRAVENOUS at 01:38

## 2022-04-03 RX ADMIN — CEFEPIME HYDROCHLORIDE 2 G: 2 INJECTION, POWDER, FOR SOLUTION INTRAVENOUS at 09:41

## 2022-04-03 RX ADMIN — CETIRIZINE HYDROCHLORIDE 10 MG: 10 TABLET ORAL at 09:39

## 2022-04-03 RX ADMIN — Medication 10 ML: at 09:30

## 2022-04-03 RX ADMIN — INSULIN LISPRO 4 UNITS: 100 INJECTION, SOLUTION INTRAVENOUS; SUBCUTANEOUS at 06:25

## 2022-04-03 RX ADMIN — PREGABALIN 300 MG: 100 CAPSULE ORAL at 09:39

## 2022-04-03 RX ADMIN — INSULIN LISPRO 7 UNITS: 100 INJECTION, SOLUTION INTRAVENOUS; SUBCUTANEOUS at 12:01

## 2022-04-03 RX ADMIN — TAMSULOSIN HYDROCHLORIDE 0.4 MG: 0.4 CAPSULE ORAL at 09:39

## 2022-04-03 NOTE — PLAN OF CARE
Goal Outcome Evaluation: patient being discharged today. PIV removed and discharge instructions reviewed with the patient and his daughters. Understanding verbalized and all questions answered.  Denies chest pain and no cough noted.  Patient has been afebrile.

## 2022-04-03 NOTE — PLAN OF CARE
Problem: Adult Inpatient Plan of Care  Goal: Plan of Care Review  Outcome: Ongoing, Progressing  Flowsheets (Taken 4/3/2022 0235)  Progress: no change  Outcome Evaluation: VITALS AS DOCUMENTED, AFEBRILE. TUSSONEX INEFFECTIVE TO RELIEVE COUGH. TESSLON JB GIVEN AS WELL. DAUGHTER AT BEDSIDE, EDUCATED ON SUGAR, NATURAL SUGAR AND CARBOHYDRATE CONSUMPTION R/T BLOOD SUGAR CONTROL. LABEL READING DEMONSTRATED. UP AD USHA TO BR AND IN ROOM. COUGHING EPISODES SEEM TO OCCUR WHEN UP AMBULATING. EDUCATED ON PULMONARY HYGIENE AND IMPORTANCE OF INCENTIVE SPIROMETER USE. RESTING QUIETLY W/O S/SX OF DISTRESS. DAUGHTER REMAINS AT BEDSIDE AND TRANSLATE PER REFERENCE. DENIES CP. C/O DISCOMFORT OF RFA. SCAB NOTED. FIRM AND SLIGHTLY REDENED. ICE PACK APPLIED. NO FURTHER C/O.   Goal Outcome Evaluation:           Progress: no change  Outcome Evaluation: VITALS AS DOCUMENTED, AFEBRILE. TUSSONEX INEFFECTIVE TO RELIEVE COUGH. TESSLON JB GIVEN AS WELL. DAUGHTER AT BEDSIDE, EDUCATED ON SUGAR, NATURAL SUGAR AND CARBOHYDRATE CONSUMPTION R/T BLOOD SUGAR CONTROL. LABEL READING DEMONSTRATED. UP AD USHA TO BR AND IN ROOM. COUGHING EPISODES SEEM TO OCCUR WHEN UP AMBULATING. EDUCATED ON PULMONARY HYGIENE AND IMPORTANCE OF INCENTIVE SPIROMETER USE. RESTING QUIETLY W/O S/SX OF DISTRESS. DAUGHTER REMAINS AT BEDSIDE AND TRANSLATE PER REFERENCE. DENIES CP. C/O DISCOMFORT OF RFA. SCAB NOTED. FIRM AND SLIGHTLY REDENED. ICE PACK APPLIED. NO FURTHER C/O.

## 2022-04-03 NOTE — DISCHARGE SUMMARY
[facility]  [phone]    DISCHARGE SUMMARY  [facility]    Patient Identification:  Name: Saul Berrios  Age: 62 y.o.  Sex: male  :  1960  MRN: [id number]  Primary Care Physician: Betsy Vega APRN    Admit date: 3/26/2022  Discharge date and time:      Discharge Diagnoses:  Community acquired pneumonia, unspecified laterality    DM (diabetes mellitus), type 2 (HCC)    HTN (hypertension)    GERD without esophagitis    Thrombocytopenia (HCC)    Fever       History of present illness from H&P:    Mr. Berrios is a 62 y.o. with no significant medical history other than diabetes that presents to [facility] complaining of shortness of breath, fever and cough. The patient was recently seen at a [facility], diagnosed with pneumonia and sent home on abx. since being home he reports worsening cough and persistent fever. Denies hemoptysis. he has taken three doses of levaquin. patient is non english speaking and this interview was taken via  phone- family present at bedside. he was given IV azithromycin and ceftriaxone in the ED. His white count was noted to be 17 and CXR noted and procalcitonin elevated. he has remained on room air but will be admitted to the hospital for observation.     Hospital Course:     62-year-old male with a history of diabetes who presented with fever and nonproductive cough, worsening for last week.  He failed outpatient course of antibiotics.  Chest x-ray shows left basilar infiltrate.  He is admitted for community-acquired pneumonia.  He was seen in consultation by infectious disease as well as pulmonary service.  Ultimately finished a course of cefepime.  He spiked temperatures for the first several days in the hospital but now is been afebrile off of Tylenol since last night.    Patient is feeling tremendously better is eating better and anxious for discharge.  Currently waiting on chest x-ray prior to  discharge.  Infectious diseases signed off and I discussed discharge plan with Dr. Reynolds.    Further hospital course by problem list:  Community-acquired pneumonia, with sepsis on admission:   -Patient finishes cefepime today.   strep pneumo and Legionella urine antigens, respiratory viral panel negative, and MRSA nares negative  -Follow-up blood cultures negative       Possibly has component of urinary tract infection  -Will need follow-up for this with urology outpatient, at least will need follow-up cystoscope given thickening of bladder  -Patient also having BPH symptoms so patient was started on Flomax by urology and can follow-up with urology outpatient.     Type 2 diabetes.  A1c 8.8, so patient will need to follow-up with primary care doctor for better control.  Over the last couple days blood sugars have been increasing and are consistently above 200.  Patient will need to get back on his Glucotrol 10 mg twice daily and also monitor diet.      Thrombocytopenia: Mild on admission, possibly related to acute illness.    platelets have normalized     Assuming improvement on chest x-ray this a.m. patient will be discharged and will need follow-up with pulmonary physicians in a month ensure resolution of infiltrates.    Nodular enlargement of the thyroid for which recommended the patient have ultrasound.  This has been communicated to the family and problem list has been updated    The patient was seen and examined on the day of discharge.    Consults:   Consults     Date and Time Order Name Status Description    3/30/2022  5:21 PM Inpatient Urology Consult Completed     3/28/2022  8:31 AM Inpatient Infectious Diseases Consult Completed     3/28/2022  7:37 AM Inpatient Pulmonology Consult Completed     3/26/2022  8:46 AM [facility] (on-call MD unless specified) Details            Results from last 7 days   Lab Units 04/01/22  0637   WBC 10*3/mm3 8.12   HEMOGLOBIN g/dL 13.7   HEMATOCRIT % 41.0   PLATELETS 10*3/mm3 231        Results from last 7 days   Lab Units 04/01/22  0637   SODIUM mmol/L 135*   POTASSIUM mmol/L 4.3   CHLORIDE mmol/L 104   CO2 mmol/L 20.3*   BUN mg/dL 10   CREATININE mg/dL 0.76   GLUCOSE mg/dL 193*   CALCIUM mg/dL 9.1       Significant Diagnostic Studies:   WBC   Date Value Ref Range Status   04/01/2022 8.12 3.40 - 10.80 10*3/mm3 Final     Hemoglobin   Date Value Ref Range Status   04/01/2022 13.7 13.0 - 17.7 g/dL Final     Hematocrit   Date Value Ref Range Status   04/01/2022 41.0 37.5 - 51.0 % Final     Platelets   Date Value Ref Range Status   04/01/2022 231 140 - 450 10*3/mm3 Final     Sodium   Date Value Ref Range Status   04/01/2022 135 (L) 136 - 145 mmol/L Final     Potassium   Date Value Ref Range Status   04/01/2022 4.3 3.5 - 5.2 mmol/L Final     Chloride   Date Value Ref Range Status   04/01/2022 104 98 - 107 mmol/L Final     CO2   Date Value Ref Range Status   04/01/2022 20.3 (L) 22.0 - 29.0 mmol/L Final     BUN   Date Value Ref Range Status   04/01/2022 10 8 - 23 mg/dL Final     Creatinine   Date Value Ref Range Status   04/01/2022 0.76 0.76 - 1.27 mg/dL Final     Glucose   Date Value Ref Range Status   04/01/2022 193 (H) 65 - 99 mg/dL Final     Calcium   Date Value Ref Range Status   04/01/2022 9.1 8.6 - 10.5 mg/dL Final     No results found for: AST, ALT, ALKPHOS  No results found for: APTT, INR  Color, UA   Date Value Ref Range Status   03/31/2022 Yellow Yellow, Straw Final     Appearance, UA   Date Value Ref Range Status   03/31/2022 Clear Clear Final     pH, UA   Date Value Ref Range Status   03/31/2022 5.5 5.0 - 8.0 Final     Glucose, UA   Date Value Ref Range Status   03/31/2022 >=1000 mg/dL (3+) (A) Negative Final     Ketones, UA   Date Value Ref Range Status   03/31/2022 Negative Negative Final     Blood, UA   Date Value Ref Range Status   03/31/2022 Negative Negative Final     Leuk Esterase, UA   Date Value Ref Range Status   03/31/2022 Negative Negative Final     Bilirubin, UA   Date  Value Ref Range Status   03/31/2022 Negative Negative Final     Urobilinogen, UA   Date Value Ref Range Status   03/31/2022 0.2 E.U./dL 0.2 - 1.0 E.U./dL Final     No results found for: TROPONINT, TROPONINI, BNP  No components found for: HGBA1C;2  No components found for: TSH;2    Imaging Results (All)     Procedure Component Value Units Date/Time    XR Chest PA & Lateral [[id number]] Collected: 03/31/22 1227     Updated: 03/31/22 1233    Narrative:      XR CHEST PA AND LATERAL-     Clinical: Pneumonia follow-up     COMPARISON CT angiogram chest 3/28/2022 and chest radiograph 3/28/2022     FINDINGS: Bibasilar airspace disease slightly diminished within the  interim with mild to moderate residual. Bibasilar effusions also  diminished. Cardiomediastinal silhouette is stable. No mediastinal or  hilar abnormality. Linear and nodular opacity within the right upper  lobe not significantly changed within the interim. Advise conservative  surveillance.     This report was finalized on 3/31/2022 12:30 PM by Dr. Sundeep Avila M.D.       CT Abdomen Pelvis With Contrast [[id number]] Collected: 03/28/22 1132     Updated: 03/30/22 1235    Narrative:      CT ANGIOGRAM CHEST- CT ABDOMEN AND PELVIS WITH CONTRAST     TECHNIQUE: CT scan of the chest performed with intravenous contrast,  pulmonary embolus protocol to include coronal, sagittal and  three-dimensional reconstruction. Standard contrast-enhanced CT abdomen  and pelvis.     Radiation dose reduction techniques were utilized, including automated  exposure control and exposure modulation based on body size.     CLINICAL INFORMATION:  Fever on antibiotic treatment, assess for source  of infection.     FINDINGS: Bilateral small free-flowing pleural effusions. Attenuation  compatible with serous fluid. There is cardiac enlargement, no  pericardial effusion. The esophagus is satisfactory in course and  caliber. Nodular enlargement of the thyroid gland which can be better  evaluated  with ultrasound.     No pulmonary embolus. No coronary artery calcification. Diameter of the  thoracic aorta is within normal limits.     There is dense consolidation demonstrated at the base of the right lower  lobe, lingular segment and left lower lobe. There are small focal  nodular areas of consolidation demonstrated throughout both lungs, upper  middle and lower lobes. These could be infectious/inflammatory or  neoplastic. Favor infectious/inflammatory.     The liver, pancreas and spleen have a normal appearance. The gallbladder  is satisfactory in appearance, no biliary duct dilatation. No gallstones  seen. The stomach is collapsed, contour within normal limits. The  adrenal glands are normal. The stomach is collapsed, contour within  normal limits. No duodenal abnormality is seen. Both kidneys demonstrate  a symmetric satisfactory pattern of enhancement. 10 mm cyst arising from  the lower pole of the left kidney and a tiny subcentimeter size  hypodense nodule within the right mid renal cortex. This is  indeterminant, 6-month followup CT renal protocol advised. No calculus  or obstructive uropathy. Both ureters are normal in course and caliber.  There is diffuse bladder wall thickening. The prostate appears normal in  size and the seminal vesicles have a typical appearance. Bladder wall  thickening could be secondary to cystitis or trabeculation.     Caliber of the large and small bowel is normal. Formed fecal material is  seen throughout the colon. No bowel wall thickening or obstruction. No  induration of the mesentery to suggest an inflammatory process. No  peritoneal fluid collection to suggest abscess. Diameter of the aorta is  normal.     No suspicious bone lesion seen.     CONCLUSION:  1. There are bilateral small pleural effusions with fairly extensive  bibasilar airspace disease as described above. Suspect pneumonia. There  are some nodular areas of consolidation bilaterally, short-term  follow-up CT  to document clearing.  2. No pulmonary embolus, aortic aneurysm or dissection.  3. Nodular enlargement of the thyroid gland which can be best evaluated  with ultrasound.  4. Bladder wall thickening, potential cystitis or trabeculation.  5. Tiny hypodense nodule within the right adrenal cortex which is  indeterminant, follow-up 6-month CT renal protocol advised.        This report was finalized on 3/28/2022 1:57 PM by Dr. Sundeep Avila M.D.       CT Angiogram Chest [[id number]] Collected: 03/28/22 1132     Updated: 03/30/22 1235    Narrative:      CT ANGIOGRAM CHEST- CT ABDOMEN AND PELVIS WITH CONTRAST     TECHNIQUE: CT scan of the chest performed with intravenous contrast,  pulmonary embolus protocol to include coronal, sagittal and  three-dimensional reconstruction. Standard contrast-enhanced CT abdomen  and pelvis.     Radiation dose reduction techniques were utilized, including automated  exposure control and exposure modulation based on body size.     CLINICAL INFORMATION:  Fever on antibiotic treatment, assess for source  of infection.     FINDINGS: Bilateral small free-flowing pleural effusions. Attenuation  compatible with serous fluid. There is cardiac enlargement, no  pericardial effusion. The esophagus is satisfactory in course and  caliber. Nodular enlargement of the thyroid gland which can be better  evaluated with ultrasound.     No pulmonary embolus. No coronary artery calcification. Diameter of the  thoracic aorta is within normal limits.     There is dense consolidation demonstrated at the base of the right lower  lobe, lingular segment and left lower lobe. There are small focal  nodular areas of consolidation demonstrated throughout both lungs, upper  middle and lower lobes. These could be infectious/inflammatory or  neoplastic. Favor infectious/inflammatory.     The liver, pancreas and spleen have a normal appearance. The gallbladder  is satisfactory in appearance, no biliary duct dilatation. No  gallstones  seen. The stomach is collapsed, contour within normal limits. The  adrenal glands are normal. The stomach is collapsed, contour within  normal limits. No duodenal abnormality is seen. Both kidneys demonstrate  a symmetric satisfactory pattern of enhancement. 10 mm cyst arising from  the lower pole of the left kidney and a tiny subcentimeter size  hypodense nodule within the right mid renal cortex. This is  indeterminant, 6-month followup CT renal protocol advised. No calculus  or obstructive uropathy. Both ureters are normal in course and caliber.  There is diffuse bladder wall thickening. The prostate appears normal in  size and the seminal vesicles have a typical appearance. Bladder wall  thickening could be secondary to cystitis or trabeculation.     Caliber of the large and small bowel is normal. Formed fecal material is  seen throughout the colon. No bowel wall thickening or obstruction. No  induration of the mesentery to suggest an inflammatory process. No  peritoneal fluid collection to suggest abscess. Diameter of the aorta is  normal.     No suspicious bone lesion seen.     CONCLUSION:  1. There are bilateral small pleural effusions with fairly extensive  bibasilar airspace disease as described above. Suspect pneumonia. There  are some nodular areas of consolidation bilaterally, short-term  follow-up CT to document clearing.  2. No pulmonary embolus, aortic aneurysm or dissection.  3. Nodular enlargement of the thyroid gland which can be best evaluated  with ultrasound.  4. Bladder wall thickening, potential cystitis or trabeculation.  5. Tiny hypodense nodule within the right adrenal cortex which is  indeterminant, follow-up 6-month CT renal protocol advised.        This report was finalized on 3/28/2022 1:57 PM by Dr. Sundeep Avila M.D.       XR Chest 1 View [[id number]] Collected: 03/28/22 0831     Updated: 03/28/22 0835    Narrative:      XR CHEST 1 VW-  03/28/2022     HISTORY: Difficulty  breathing.     Heart size is within normal limits. There is moderate patchy increased  density in the left lung base which has progressed somewhat since the  03/26/2022 study. There is mild patchy increased density in the right  infrahilar region as well. This also appears slightly more severe than  on the previous study. Lungs are underinflated.     No pneumothorax is seen.       Impression:      1. Moderate patchy increased density in the left lung base is consistent  with pneumonia. This has progressed somewhat since 03/26/2022 study.  There may be some mild atelectasis or pneumonia in the right infrahilar  region as well.     This report was finalized on 3/28/2022 8:32 AM by Dr. Rob Llanos M.D.       XR Chest 1 View [[id number]] Collected: 03/26/22 0753     Updated: 03/26/22 0758    Narrative:      XR CHEST 1 VW-     HISTORY: Male who is 62 years-old,  short of breath     TECHNIQUE: Frontal view of the chest     COMPARISON: None available     FINDINGS: The heart size is borderline. Pulmonary vasculature is  unremarkable.  Is tortuous. Patchy infiltrate is seen at the left base, minimally at  the right base. No pleural effusion, or pneumothorax. No acute osseous  process.       Impression:      Patchy left more than right basilar infiltrate, follow-up  recommended. Borderline heart size. Tortuous aorta.     This report was finalized on 3/26/2022 7:55 AM by Dr. Tobin Kumar M.D.         No results found for: SITE, ALLENTEST, PHART, KUL7TDL, PO2ART, UUD8WLA, BASEEXCESS, I4JGZTOH, HGBBG, HCTABG, OXYHEMOGLOBI, METHHGBN, CARBOXYHGB, CO2CT, BAROMETRIC, MODALITY, FIO2       Discharge Medications      New Medications      Instructions Start Date   tamsulosin 0.4 MG capsule 24 hr capsule  Commonly known as: FLOMAX   0.4 mg, Oral, Daily   Start Date: April 4, 2022        Continue These Medications      Instructions Start Date   acetaminophen 325 MG tablet  Commonly known as: TYLENOL   650 mg, Oral, Every 6  Hours PRN      benzonatate 100 MG capsule  Commonly known as: TESSALON   100 mg, Oral, 3 Times Daily PRN      cetirizine 10 MG tablet  Commonly known as: zyrTEC   10 mg, Oral, Daily      glipizide 10 MG tablet  Commonly known as: GLUCOTROL   10 mg, Oral, 2 Times Daily Before Meals, Take 2 tabs daily      pregabalin 100 MG capsule  Commonly known as: LYRICA   300 mg, Oral, 2 Times Daily         Stop These Medications    levoFLOXacin 750 MG tablet  Commonly known as: LEVAQUIN              Patient Instructions:       No future appointments.      Follow-up Information     Boby Ledezma MD Follow up in 3 week(s).    Specialty: Urology  Contact information:  [address]  [facility] [id number] [phone]             Betsy Vega APRN Follow up in 1 week(s).    Specialty: Nurse Practitioner  Why: for follow up on diabetes  Contact information:  [address]  [address]  [facility] [id number] [phone]             Kimberly Reynolds MD Follow up in 1 month(s).    Specialties: Pulmonary Disease, Sleep Medicine  Why: follow up chest xray  Contact information:  [address]  [facility] [id number] [phone]                         Discharge Order (From admission, onward)     Start     Ordered    04/03/22 1018  Discharge patient  Once        Expected Discharge Date: 04/03/22    Discharge Disposition: Home or Self Care    Physician of Record for Attribution - Please select from Treatment Team: ARNAUD GIRON [[id number]]    Review needed by CMO to determine Physician of Record: No       Question Answer Comment   Physician of Record for Attribution - Please select from Treatment Team ARNAUD GIRON    Review needed by CMO to determine Physician of Record No        04/03/22 1020                Diet Order   Procedures   • Diet Regular; Consistent Carbohydrate       TEST  RESULTS PENDING AT DISCHARGE  Pending Labs     Order Current Status    Blood Culture - Blood, Arm, Left Preliminary result     Blood Culture - Blood, Hand, Left Preliminary result    Fungus Culture, Blood - Blood, Arm, Left Preliminary result            Discharge instructions:  Follow up with your primary care provider in 1-2 weeks with a cbc and cmp, and follow-up on elevated blood sugars here in the hospital to ensure there is improvement        Total time spent discharging patient including evaluation, post hospitalization follow up,  medication and post hospitalization instructions and education, total time exceeds 30 minutes.    Signed:  Calderon Yoo MD  4/3/2022  10:23 EDT

## 2022-04-04 LAB
BACTERIA SPEC AEROBE CULT: NORMAL
BACTERIA SPEC AEROBE CULT: NORMAL

## 2022-04-04 NOTE — OUTREACH NOTE
Prep Survey    Flowsheet Row Responses   [facility] facility patient discharged from? [place]   Is LACE score < 7 ? No   Emergency Room discharge w/ pulse ox? No   Eligibility Readm Mgmt   Discharge diagnosis Community acquired pneumonia, unspecified laterality   Does the patient have one of the following disease processes/diagnoses(primary or secondary)? COPD/Pneumonia   Does the patient have Home health ordered? No   Is there a DME ordered? No   Prep survey completed? Yes          LIANA BLOCK - Registered Nurse

## 2022-04-04 NOTE — CASE MANAGEMENT/SOCIAL WORK
Case Management Discharge Note      Final Note: DC'd home 4/3                  Transportation Services  Private: Car    Final Discharge Disposition Code: 01 - home or self-care

## 2022-04-06 ENCOUNTER — READMISSION MANAGEMENT (OUTPATIENT)
Dept: CALL CENTER | Facility: HOSPITAL | Age: 62
End: 2022-04-06

## 2022-04-06 NOTE — OUTREACH NOTE
COPD/PN Week 1 Survey    Flowsheet Row Responses   [facility] patient discharged from? [place]   Does the patient have one of the following disease processes/diagnoses(primary or secondary)? COPD/Pneumonia   Was the primary reason for admission: Pneumonia   Week 1 attempt successful? No   Unsuccessful attempts Attempt 1  [Patient phone attempt x 1 with  # [phone] unsuccessful.]          YULISA VORA - Registered Nurse

## 2022-04-12 ENCOUNTER — READMISSION MANAGEMENT (OUTPATIENT)
Dept: CALL CENTER | Facility: HOSPITAL | Age: 62
End: 2022-04-12

## 2022-04-12 LAB — GLUCOSE BLDC GLUCOMTR-MCNC: 176 MG/DL (ref 70–130)

## 2022-04-12 NOTE — OUTREACH NOTE
COPD/PN Week 1 Survey    Flowsheet Row Responses   [facility] facility patient discharged from? [place]   Does the patient have one of the following disease processes/diagnoses(primary or secondary)? COPD/Pneumonia   Was the primary reason for admission: Pneumonia   Week 1 attempt successful? No   Unsuccessful attempts Attempt 2          TEX CORRALES - Registered Nurse

## 2022-04-18 ENCOUNTER — READMISSION MANAGEMENT (OUTPATIENT)
Dept: CALL CENTER | Facility: HOSPITAL | Age: 62
End: 2022-04-18

## 2022-04-18 NOTE — OUTREACH NOTE
COPD/PN Week 1 Survey    Flowsheet Row Responses   [facility] patient discharged from? [place]   Does the patient have one of the following disease processes/diagnoses(primary or secondary)? COPD/Pneumonia   Was the primary reason for admission: Pneumonia   Week 1 attempt successful? No   Unsuccessful attempts Attempt 2   Discharge diagnosis Community acquired pneumonia, unspecified laterality   If primary language is not English what is the name and relationship or agency of  used?  ID [id number]          OCTAVIANO T - Registered Nurse

## 2022-04-21 ENCOUNTER — READMISSION MANAGEMENT (OUTPATIENT)
Dept: CALL CENTER | Facility: HOSPITAL | Age: 62
End: 2022-04-21

## 2022-04-21 NOTE — OUTREACH NOTE
COPD/PN Week 1 Survey    Flowsheet Row Responses   [facility] facility patient discharged from? [place]   Does the patient have one of the following disease processes/diagnoses(primary or secondary)? COPD/Pneumonia   Was the primary reason for admission: Pneumonia   Week 1 attempt successful? No   Unsuccessful attempts Attempt 3   Rescheduled Revoked  [UTR x 4]          YUE DE LOS SANTOS - Registered Nurse

## 2022-04-24 LAB — FUNGUS WND CULT: NORMAL

## 2022-06-07 ENCOUNTER — OFFICE VISIT (OUTPATIENT)
Dept: GASTROENTEROLOGY | Facility: CLINIC | Age: 62
End: 2022-06-07

## 2022-06-07 ENCOUNTER — TELEPHONE (OUTPATIENT)
Dept: GASTROENTEROLOGY | Facility: CLINIC | Age: 62
End: 2022-06-07

## 2022-06-07 VITALS
DIASTOLIC BLOOD PRESSURE: 75 MMHG | SYSTOLIC BLOOD PRESSURE: 112 MMHG | WEIGHT: 160.6 LBS | TEMPERATURE: 97.3 F | BODY MASS INDEX: 28.46 KG/M2 | HEIGHT: 63 IN | HEART RATE: 78 BPM

## 2022-06-07 DIAGNOSIS — R10.12 LEFT UPPER QUADRANT ABDOMINAL PAIN: Primary | ICD-10-CM

## 2022-06-07 PROCEDURE — 99203 OFFICE O/P NEW LOW 30 MIN: CPT | Performed by: INTERNAL MEDICINE

## 2022-06-07 RX ORDER — QUETIAPINE FUMARATE 100 MG/1
100 TABLET, FILM COATED ORAL 2 TIMES DAILY
COMMUNITY

## 2022-06-07 RX ORDER — SODIUM CHLORIDE, SODIUM LACTATE, POTASSIUM CHLORIDE, CALCIUM CHLORIDE 600; 310; 30; 20 MG/100ML; MG/100ML; MG/100ML; MG/100ML
30 INJECTION, SOLUTION INTRAVENOUS CONTINUOUS
Status: CANCELLED | OUTPATIENT
Start: 2022-11-10

## 2022-06-07 RX ORDER — OMEPRAZOLE 40 MG/1
40 CAPSULE, DELAYED RELEASE ORAL DAILY
COMMUNITY

## 2022-06-07 RX ORDER — INSULIN GLARGINE AND LIXISENATIDE 100; 33 U/ML; UG/ML
INJECTION, SOLUTION SUBCUTANEOUS
COMMUNITY

## 2022-06-07 RX ORDER — INSULIN DEGLUDEC INJECTION 100 U/ML
INJECTION, SOLUTION SUBCUTANEOUS
COMMUNITY

## 2022-06-07 RX ORDER — ATORVASTATIN CALCIUM 40 MG/1
40 TABLET, FILM COATED ORAL DAILY
COMMUNITY

## 2022-06-07 RX ORDER — NATEGLINIDE 60 MG/1
60 TABLET ORAL
COMMUNITY

## 2022-06-07 RX ORDER — CHLORAL HYDRATE 500 MG
CAPSULE ORAL
COMMUNITY

## 2022-06-07 RX ORDER — DULAGLUTIDE 1.5 MG/.5ML
INJECTION, SOLUTION SUBCUTANEOUS
COMMUNITY

## 2022-06-07 NOTE — PROGRESS NOTES
Chief Complaint   Patient presents with   • gastritis        Saul Berrios is a  62 y.o. male here for an initial visit for left upper quadrant abdominal pain    HPI this 62-year-old [place] male patient of JM Baugh presents with a 2-year history of left upper quadrant pain that has been progressive.  The pain is described as a stabbing sensation and only occurs on an intermittent basis usually last for 4 to 5 seconds but can recur specifically after eating fatty foods or spicy foods.  Also noted with increased p.o. intake but not associated with bowel function or position.  No complaints of dysphagia.  States bowel pattern is routine with no constipation or diarrhea.  Denies any weight gain or loss in the last 6 months and family history is negative for peptic ulcer disease.  He has had no previous colonoscopic evaluation.  All of this information is obtained through an  who accompanies the patient.  He evidently was scheduled to have upper endoscopy in August of last year but this was not completed per his report.    Past Medical History:   Diagnosis Date   • Arthritis    • Diabetes mellitus (HCC)    • Hyperlipidemia    • Hypertension        Current Outpatient Medications   Medication Sig Dispense Refill   • acetaminophen (TYLENOL) 325 MG tablet Take 650 mg by mouth Every 6 (Six) Hours As Needed for Mild Pain .     • atorvastatin (LIPITOR) 40 MG tablet Take 40 mg by mouth Daily.     • cetirizine (zyrTEC) 10 MG tablet Take 10 mg by mouth Daily.     • Dulaglutide (Trulicity) 1.5 MG/0.5ML solution pen-injector Inject  under the skin into the appropriate area as directed.     • glipizide (GLUCOTROL) 10 MG tablet Take 10 mg by mouth 2 (Two) Times a Day Before Meals. Take 2 tabs daily     • insulin degludec (Tresiba FlexTouch) 100 UNIT/ML solution pen-injector injection Inject  under the skin into the appropriate area as directed.     • Insulin Glargine-Lixisenatide (Soliqua) 100-33 UNT-MCG/ML solution  pen-injector injection Inject  under the skin into the appropriate area as directed.     • nateglinide (STARLIX) 60 MG tablet Take 60 mg by mouth 3 (Three) Times a Day Before Meals.     • Omega-3 Fatty Acids (fish oil) 1000 MG capsule capsule Take  by mouth Daily With Breakfast.     • omeprazole (priLOSEC) 40 MG capsule Take 40 mg by mouth Daily.     • pregabalin (LYRICA) 100 MG capsule Take 300 mg by mouth 2 (Two) Times a Day.     • QUEtiapine (SEROquel) 100 MG tablet Take 100 mg by mouth 2 (Two) Times a Day. bid     • Dulaglutide 0.75 MG/0.5ML solution pen-injector Inject  under the skin into the appropriate area as directed.       No current facility-administered medications for this visit.       PRN Meds:.    No Known Allergies    Social History     Socioeconomic History   • Marital status:    Tobacco Use   • Smoking status: Current Some Day Smoker     Types: Pipe   • Smokeless tobacco: Never Used   Substance and Sexual Activity   • Alcohol use: Not Currently       History reviewed. No pertinent family history.    Review of Systems   Constitutional: Negative for activity change, appetite change, fatigue and unexpected weight change.   HENT: Negative for congestion, facial swelling, sore throat, trouble swallowing and voice change.    Eyes: Negative for photophobia and visual disturbance.   Respiratory: Negative for cough and choking.    Cardiovascular: Negative for chest pain.   Gastrointestinal: Positive for abdominal pain. Negative for abdominal distention, anal bleeding, blood in stool, constipation, diarrhea, nausea, rectal pain and vomiting.   Endocrine: Negative for polyphagia.   Musculoskeletal: Negative for arthralgias, gait problem and joint swelling.   Skin: Negative for color change, pallor and rash.   Allergic/Immunologic: Negative for food allergies.   Neurological: Negative for speech difficulty and headaches.   Hematological: Does not bruise/bleed easily.   Psychiatric/Behavioral: Negative  for agitation, confusion and sleep disturbance.       Vitals:    06/07/22 1001   BP: 112/75   Pulse: 78   Temp: 97.3 °F (36.3 °C)       Physical Exam  Vitals reviewed.   Constitutional:       General: He is not in acute distress.     Appearance: He is well-developed. He is not diaphoretic.   HENT:      Head: Normocephalic.      Mouth/Throat:      Pharynx: No oropharyngeal exudate.   Eyes:      General: No scleral icterus.     Conjunctiva/sclera: Conjunctivae normal.   Neck:      Thyroid: No thyromegaly.   Cardiovascular:      Rate and Rhythm: Normal rate and regular rhythm.      Heart sounds: No murmur heard.  Pulmonary:      Effort: No respiratory distress.      Breath sounds: Normal breath sounds. No wheezing or rales.   Abdominal:      General: Bowel sounds are normal. There is no distension.      Palpations: Abdomen is soft. There is no mass.      Tenderness: There is no abdominal tenderness.   Musculoskeletal:         General: No tenderness. Normal range of motion.      Cervical back: Normal range of motion.   Lymphadenopathy:      Cervical: No cervical adenopathy.   Skin:     General: Skin is warm and dry.      Findings: No erythema or rash.   Neurological:      Mental Status: He is alert and oriented to person, place, and time.   Psychiatric:         Behavior: Behavior normal.         ASSESSMENT  #1 left upper quadrant abdominal pain: Persistent issue with trigger related to p.o. intake and specific food sources.        PLAN  Schedule EGD  For screening purposes would eventually offer colonoscopic examination once upper GI assessment is complete      ICD-10-CM ICD-9-CM   1. Left upper quadrant abdominal pain  R10.12 789.02

## 2022-10-27 ENCOUNTER — TELEPHONE (OUTPATIENT)
Dept: GASTROENTEROLOGY | Facility: CLINIC | Age: 62
End: 2022-10-27

## 2022-10-27 NOTE — TELEPHONE ENCOUNTER
CLAY patient via telephone for. Scheduled  12/07/2022 with arrival time of 0230. Prep paperwork mailed to verified address on file. Patient advised arrival time may change based on [facility] guidelines. CLAY SALINAS

## 2022-12-06 ENCOUNTER — TELEPHONE (OUTPATIENT)
Dept: GASTROENTEROLOGY | Facility: CLINIC | Age: 62
End: 2022-12-06

## 2022-12-06 NOTE — TELEPHONE ENCOUNTER
Caller: CLARITZA    Relationship: Other REGISTERED NURSE AT [facility]    Best call back number: [phone]    What is the best time to reach you: AS SOON AS CAN    Who are you requesting to speak with (clinical staff, provider,  specific staff member): CLINICAL STAFF OR     Do you know the name of the person who called: CLARITZA - REGISTERED NURSE AT [facility]    What was the call regarding: INSTRUCTIONS FOR PROCEDURE    Do you require a callback: YES

## 2022-12-07 ENCOUNTER — HOSPITAL ENCOUNTER (OUTPATIENT)
Facility: HOSPITAL | Age: 62
Setting detail: HOSPITAL OUTPATIENT SURGERY
Discharge: HOME | End: 2022-12-07
Attending: INTERNAL MEDICINE | Admitting: INTERNAL MEDICINE

## 2022-12-07 ENCOUNTER — ANESTHESIA EVENT (OUTPATIENT)
Dept: GASTROENTEROLOGY | Facility: HOSPITAL | Age: 62
End: 2022-12-07

## 2022-12-07 ENCOUNTER — ANESTHESIA (OUTPATIENT)
Dept: GASTROENTEROLOGY | Facility: HOSPITAL | Age: 62
End: 2022-12-07

## 2022-12-07 VITALS
DIASTOLIC BLOOD PRESSURE: 77 MMHG | SYSTOLIC BLOOD PRESSURE: 113 MMHG | HEART RATE: 70 BPM | HEIGHT: 63 IN | RESPIRATION RATE: 16 BRPM | OXYGEN SATURATION: 99 % | BODY MASS INDEX: 28.35 KG/M2 | WEIGHT: 160 LBS

## 2022-12-07 DIAGNOSIS — R10.12 LEFT UPPER QUADRANT ABDOMINAL PAIN: ICD-10-CM

## 2022-12-07 LAB — GLUCOSE BLDC GLUCOMTR-MCNC: 176 MG/DL (ref 70–130)

## 2022-12-07 PROCEDURE — 25010000002 PROPOFOL 10 MG/ML EMULSION: Performed by: NURSE ANESTHETIST, CERTIFIED REGISTERED

## 2022-12-07 PROCEDURE — S0260 H&P FOR SURGERY: HCPCS | Performed by: INTERNAL MEDICINE

## 2022-12-07 PROCEDURE — 88305 TISSUE EXAM BY PATHOLOGIST: CPT | Performed by: INTERNAL MEDICINE

## 2022-12-07 PROCEDURE — 82962 GLUCOSE BLOOD TEST: CPT

## 2022-12-07 PROCEDURE — 0 LIDOCAINE 1 % SOLUTION: Performed by: NURSE ANESTHETIST, CERTIFIED REGISTERED

## 2022-12-07 PROCEDURE — 87081 CULTURE SCREEN ONLY: CPT | Performed by: INTERNAL MEDICINE

## 2022-12-07 PROCEDURE — 43239 EGD BIOPSY SINGLE/MULTIPLE: CPT | Performed by: INTERNAL MEDICINE

## 2022-12-07 RX ORDER — SODIUM CHLORIDE 0.9 % (FLUSH) 0.9 %
10 SYRINGE (ML) INJECTION EVERY 12 HOURS SCHEDULED
Status: DISCONTINUED | OUTPATIENT
Start: 2022-12-07 | End: 2022-12-07 | Stop reason: HOSPADM

## 2022-12-07 RX ORDER — LIDOCAINE HYDROCHLORIDE 10 MG/ML
INJECTION, SOLUTION INFILTRATION; PERINEURAL AS NEEDED
Status: DISCONTINUED | OUTPATIENT
Start: 2022-12-07 | End: 2022-12-07 | Stop reason: SURG

## 2022-12-07 RX ORDER — SODIUM CHLORIDE, SODIUM LACTATE, POTASSIUM CHLORIDE, CALCIUM CHLORIDE 600; 310; 30; 20 MG/100ML; MG/100ML; MG/100ML; MG/100ML
30 INJECTION, SOLUTION INTRAVENOUS CONTINUOUS
Status: DISCONTINUED | OUTPATIENT
Start: 2022-12-07 | End: 2022-12-07 | Stop reason: HOSPADM

## 2022-12-07 RX ORDER — SODIUM CHLORIDE 0.9 % (FLUSH) 0.9 %
10 SYRINGE (ML) INJECTION AS NEEDED
Status: DISCONTINUED | OUTPATIENT
Start: 2022-12-07 | End: 2022-12-07 | Stop reason: HOSPADM

## 2022-12-07 RX ORDER — PROPOFOL 10 MG/ML
VIAL (ML) INTRAVENOUS AS NEEDED
Status: DISCONTINUED | OUTPATIENT
Start: 2022-12-07 | End: 2022-12-07 | Stop reason: SURG

## 2022-12-07 RX ADMIN — SODIUM CHLORIDE, POTASSIUM CHLORIDE, SODIUM LACTATE AND CALCIUM CHLORIDE 30 ML/HR: 600; 310; 30; 20 INJECTION, SOLUTION INTRAVENOUS at 12:42

## 2022-12-07 RX ADMIN — LIDOCAINE HYDROCHLORIDE 40 MG: 10 INJECTION, SOLUTION INFILTRATION; PERINEURAL at 13:11

## 2022-12-07 RX ADMIN — PROPOFOL 200 MCG/KG/MIN: 10 INJECTION, EMULSION INTRAVENOUS at 13:08

## 2022-12-07 RX ADMIN — PROPOFOL 50 MG: 10 INJECTION, EMULSION INTRAVENOUS at 13:11

## 2022-12-07 RX ADMIN — PROPOFOL 50 MG: 10 INJECTION, EMULSION INTRAVENOUS at 13:08

## 2022-12-07 NOTE — ANESTHESIA POSTPROCEDURE EVALUATION
"Patient: Man Limbu    Procedure Summary     Date: 12/07/22 Room / Location:  [facility]    Anesthesia Start: 1255 Anesthesia Stop: 1326    Procedure: ESOPHAGOGASTRODUODENOSCOPY with bx (Esophagus) Diagnosis:       Esophagitis      Gastritis      Duodenitis      (Left upper quadrant abdominal pain [R10.12])    Surgeons: Martin Metcalf MD Provider: Ricardo Mendoza MD    Anesthesia Type: MAC ASA Status: 3          Anesthesia Type: MAC    Vitals  Vitals Value Taken Time   /77 12/07/22 1346   Temp     Pulse 70 12/07/22 1346   Resp 16 12/07/22 1346   SpO2 99 % 12/07/22 1346           Post Anesthesia Care and Evaluation    Patient location during evaluation: bedside  Patient participation: complete - patient participated  Level of consciousness: awake and alert  Pain management: adequate    Airway patency: patent  Anesthetic complications: No anesthetic complications    Cardiovascular status: acceptable  Respiratory status: acceptable  Hydration status: acceptable    Comments: /77 (BP Location: Left arm, Patient Position: Lying)   Pulse 70   Resp 16   Ht 160 cm (63\")   Wt 72.6 kg (160 lb)   SpO2 99%   BMI 28.34 kg/m²       "

## 2022-12-07 NOTE — H&P
[facility]  Pre Procedure History & Physical    Chief Complaint:   Abdominal pain    Subjective     HPI:   This 62-year-old male presents the endoscopy suite for upper endoscopic evaluation.  He has chronic issues with left upper quadrant abdominal pain that is usually precipitated by p.o. intake.    Past Medical History:   Past Medical History:   Diagnosis Date   • Arthritis    • Diabetes mellitus (HCC)    • Hyperlipidemia    • Hypertension        Past Surgical History:  Past Surgical History:   Procedure Laterality Date   • UPPER GASTROINTESTINAL ENDOSCOPY  08/2021       Family History:  No family history on file.    Social History:   reports that he has been smoking pipe. He has never used smokeless tobacco. He reports that he does not currently use alcohol.    Medications:   No medications prior to admission.       Allergies:  Patient has no known allergies.    ROS:    Pertinent items are noted in HPI, all other systems reviewed and negative     Objective     There were no vitals taken for this visit.    Physical Exam   Constitutional: Pt is oriented to person, place, and time and well-developed, well-nourished, and in no distress.   Mouth/Throat: Oropharynx is clear and moist.   Neck: Normal range of motion.   Cardiovascular: Normal rate, regular rhythm and normal heart sounds.    Pulmonary/Chest: Effort normal and breath sounds normal.   Abdominal: Soft. Nontender  Skin: Skin is warm and dry.   Psychiatric: Mood, memory, affect and judgment normal.     Assessment & Plan     Diagnosis:  Left upper quadrant abdominal pain    Anticipated Surgical Procedure:  EGD    The risks, benefits, and alternatives of this procedure have been discussed with the patient or the responsible party- the patient understands and agrees to proceed.

## 2022-12-07 NOTE — ANESTHESIA PREPROCEDURE EVALUATION
Anesthesia Evaluation                  Airway   Mallampati: II  TM distance: >3 FB  No difficulty expected  Dental      Pulmonary    (+) a smoker Current,   Cardiovascular     (+) hypertension, hyperlipidemia,       Neuro/Psych  GI/Hepatic/Renal/Endo    (+)  GERD,  diabetes mellitus, thyroid problem     Musculoskeletal     Abdominal    Substance History      OB/GYN          Other   arthritis,                      Anesthesia Plan    ASA 3     MAC     intravenous induction     Anesthetic plan, risks, benefits, and alternatives have been provided, discussed and informed consent has been obtained with: patient.    Plan discussed with CRNA.        CODE STATUS:

## 2022-12-08 LAB
LAB AP CASE REPORT: NORMAL
PATH REPORT.FINAL DX SPEC: NORMAL
PATH REPORT.GROSS SPEC: NORMAL

## 2022-12-09 LAB — UREASE TISS QL: NEGATIVE

## 2022-12-14 ENCOUNTER — TELEPHONE (OUTPATIENT)
Dept: GASTROENTEROLOGY | Facility: CLINIC | Age: 62
End: 2022-12-14

## 2022-12-14 NOTE — TELEPHONE ENCOUNTER
Call to pt via [employer].  Advise per path report that small bowel bx benign.  Stomach body with with mild inflammation. GE junction with extensive chronic inflammation.     Advise per Dr Metcalf note.  Verb understanding.  On omeprazole 40 mg daily.  Denies any further abd pain.

## 2023-05-02 ENCOUNTER — OFFICE VISIT (OUTPATIENT)
Dept: GASTROENTEROLOGY | Facility: CLINIC | Age: 63
End: 2023-05-02
Payer: MEDICAID

## 2023-05-02 ENCOUNTER — TELEPHONE (OUTPATIENT)
Dept: GASTROENTEROLOGY | Facility: CLINIC | Age: 63
End: 2023-05-02
Payer: MEDICAID

## 2023-05-02 VITALS — WEIGHT: 162.8 LBS | BODY MASS INDEX: 28.84 KG/M2 | HEIGHT: 63 IN | TEMPERATURE: 96.8 F

## 2023-05-02 DIAGNOSIS — K21.9 GASTROESOPHAGEAL REFLUX DISEASE, UNSPECIFIED WHETHER ESOPHAGITIS PRESENT: Primary | ICD-10-CM

## 2023-05-02 DIAGNOSIS — R10.12 LEFT UPPER QUADRANT ABDOMINAL PAIN: ICD-10-CM

## 2023-05-02 PROCEDURE — 1160F RVW MEDS BY RX/DR IN RCRD: CPT | Performed by: INTERNAL MEDICINE

## 2023-05-02 PROCEDURE — 99214 OFFICE O/P EST MOD 30 MIN: CPT | Performed by: INTERNAL MEDICINE

## 2023-05-02 PROCEDURE — 1159F MED LIST DOCD IN RCRD: CPT | Performed by: INTERNAL MEDICINE

## 2023-05-02 RX ORDER — ALBUTEROL SULFATE 90 UG/1
AEROSOL, METERED RESPIRATORY (INHALATION)
COMMUNITY
Start: 2023-03-14

## 2023-05-02 RX ORDER — SUCRALFATE 1 G/1
1 TABLET ORAL 4 TIMES DAILY
Qty: 120 TABLET | Refills: 5 | Status: SHIPPED | OUTPATIENT
Start: 2023-05-02

## 2023-05-02 RX ORDER — TIOTROPIUM BROMIDE AND OLODATEROL 3.124; 2.736 UG/1; UG/1
SPRAY, METERED RESPIRATORY (INHALATION)
COMMUNITY
Start: 2023-04-07

## 2023-05-02 NOTE — TELEPHONE ENCOUNTER
SUPPORTED BY TRANSLATION SERVICES, INFORMED PT TO CONTINUE TAKING MEDS ALREADY RX AND CALL OFFICE TO REPORT PROGRESS IN 2 WEEKS.

## 2023-05-02 NOTE — PROGRESS NOTES
Chief Complaint   Patient presents with   • EGD f/up   • Abdominal Pain        Saul Berrios is a  63 y.o. male here for a follow up visit for GERD, abdominal pain    HPI this 63-year-old [place] male patient of JM Baugh presents in follow-up since undergoing upper endoscopy on December 7, 2022.  He was initially seen June 2022 with complaints of left upper quadrant abdominal pain and reflux.  He has been treated with omeprazole 40 mg which has afforded some symptomatic relief of his reflux but his abdominal pain persists and usually is triggered by dietary intake.  We talked about further assessment but will initiate Carafate suspension 1 g before meals and at bedtime to see if he responds to this first.  He will call with a progress report in 2 weeks time.  If his symptoms do not bernadette then I would consider more formal evaluation including a CT scan and possible small bowel study.    Past Medical History:   Diagnosis Date   • Arthritis    • Diabetes mellitus    • Hyperlipidemia        Current Outpatient Medications   Medication Sig Dispense Refill   • acetaminophen (TYLENOL) 325 MG tablet Take 2 tablets by mouth Every 6 (Six) Hours As Needed for Mild Pain.     • atorvastatin (LIPITOR) 40 MG tablet Take 1 tablet by mouth Daily.     • cetirizine (zyrTEC) 10 MG tablet Take 1 tablet by mouth Daily.     • Empagliflozin (JARDIANCE PO) Take  by mouth.     • glipizide (GLUCOTROL) 10 MG tablet Take 1 tablet by mouth 2 (Two) Times a Day Before Meals. Take 2 tabs daily     • insulin degludec (Tresiba FlexTouch) 100 UNIT/ML solution pen-injector injection Inject  under the skin into the appropriate area as directed.     • nateglinide (STARLIX) 60 MG tablet Take 1 tablet by mouth 3 (Three) Times a Day Before Meals.     • omeprazole (priLOSEC) 40 MG capsule Take 1 capsule by mouth Daily.     • pregabalin (LYRICA) 100 MG capsule Take 3 capsules by mouth 2 (Two) Times a Day.     • QUEtiapine (SEROquel) 100 MG tablet Take 1  tablet by mouth 2 (Two) Times a Day. bid     • Stiolto Respimat 2.5-2.5 MCG/ACT aerosol solution inhaler      • Ventolin  (90 Base) MCG/ACT inhaler      • Dulaglutide (Trulicity) 1.5 MG/0.5ML solution pen-injector Inject  under the skin into the appropriate area as directed.     • Dulaglutide 0.75 MG/0.5ML solution pen-injector Inject  under the skin into the appropriate area as directed.     • Insulin Glargine-Lixisenatide (Soliqua) 100-33 UNT-MCG/ML solution pen-injector injection Inject  under the skin into the appropriate area as directed.     • Omega-3 Fatty Acids (fish oil) 1000 MG capsule capsule Take  by mouth Daily With Breakfast. (Patient not taking: Reported on 5/2/2023)       No current facility-administered medications for this visit.       PRN Meds:.    No Known Allergies    Social History     Socioeconomic History   • Marital status:    Tobacco Use   • Smoking status: Some Days     Types: Pipe   • Smokeless tobacco: Never   Vaping Use   • Vaping Use: Every day   Substance and Sexual Activity   • Alcohol use: Not Currently   • Drug use: Not Currently   • Sexual activity: Defer       No family history on file.    Review of Systems   Constitutional: Negative for activity change, appetite change, fatigue and unexpected weight change.   HENT: Negative for congestion, facial swelling, sore throat, trouble swallowing and voice change.    Eyes: Negative for photophobia and visual disturbance.   Respiratory: Negative for cough and choking.    Cardiovascular: Negative for chest pain.   Gastrointestinal: Positive for abdominal pain. Negative for abdominal distention, anal bleeding, blood in stool, constipation, diarrhea, nausea, rectal pain and vomiting.        GERD   Endocrine: Negative for polyphagia.   Musculoskeletal: Negative for arthralgias, gait problem and joint swelling.   Skin: Negative for color change, pallor and rash.   Allergic/Immunologic: Negative for food allergies.   Neurological:  Negative for speech difficulty and headaches.   Hematological: Does not bruise/bleed easily.   Psychiatric/Behavioral: Negative for agitation, confusion and sleep disturbance.       Vitals:    05/02/23 1241   Temp: 96.8 °F (36 °C)       Physical Exam  Constitutional:       Appearance: He is well-developed.   HENT:      Head: Normocephalic.   Eyes:      Conjunctiva/sclera: Conjunctivae normal.   Cardiovascular:      Rate and Rhythm: Normal rate and regular rhythm.   Pulmonary:      Breath sounds: Normal breath sounds.   Abdominal:      General: Bowel sounds are normal.      Palpations: Abdomen is soft.   Musculoskeletal:         General: Normal range of motion.      Cervical back: Normal range of motion.   Skin:     General: Skin is warm and dry.   Neurological:      Mental Status: He is alert and oriented to person, place, and time.   Psychiatric:         Behavior: Behavior normal.         ASSESSMENT   #1 GERD: Improved with PPI  #2 left upper quadrant abdominal pain: No change with treatment      PLAN  Carafate suspension 1 g before meals and at bedtime  Continue PPI  Call with a progress report in 2 weeks time  If no improvement consider further testing to include CT of the abdomen and small bowel study.  May also consider mesenteric Doppler duplex study given the stimulus for the pain associated with p.o. intake      ICD-10-CM ICD-9-CM   1. Gastroesophageal reflux disease, unspecified whether esophagitis present  K21.9 530.81   2. Left upper quadrant abdominal pain  R10.12 789.02

## 2023-11-02 ENCOUNTER — TELEPHONE (OUTPATIENT)
Dept: GASTROENTEROLOGY | Facility: CLINIC | Age: 63
End: 2023-11-02
Payer: MEDICAID

## 2023-11-02 RX ORDER — SUCRALFATE 1 G/1
1 TABLET ORAL 4 TIMES DAILY
Qty: 120 TABLET | Refills: 5 | Status: SHIPPED | OUTPATIENT
Start: 2023-11-02

## 2023-11-02 NOTE — TELEPHONE ENCOUNTER
Rec'd fax from [facility] requesting refill for Sucralfate 1 gm tab. Q po qid ac. #120.     Msg sent to BACILIO Rosen.

## 2023-11-30 ENCOUNTER — TELEPHONE (OUTPATIENT)
Dept: GASTROENTEROLOGY | Facility: CLINIC | Age: 63
End: 2023-11-30

## 2023-11-30 RX ORDER — SUCRALFATE ORAL 1 G/10ML
1 SUSPENSION ORAL 4 TIMES DAILY
Qty: 1200 ML | Refills: 1 | Status: SHIPPED | OUTPATIENT
Start: 2023-11-30

## 2023-11-30 NOTE — TELEPHONE ENCOUNTER
I sent sucralfate liquid into the pharmacy.  FYI this is a Dr. Metcalf patient only seen by Dr. Metcalf.

## 2023-11-30 NOTE — TELEPHONE ENCOUNTER
Hub staff attempted to follow warm transfer process and was unsuccessful     Caller: KASSANDRA    Relationship to patient: PHARMACIST AT [facility] Best call back number: [phone]    Patient is needing: KASSANDRA IS NEEDING TO KNOW IF IT'S OK FOR HER TO CHANGE TO THE LIQUID FOR SUCRALFATE SINCE THE PILLS ARE ON BACK ORDER. PLEASE CALL ASAP

## 2023-12-01 ENCOUNTER — APPOINTMENT (OUTPATIENT)
Dept: GENERAL RADIOLOGY | Facility: HOSPITAL | Age: 63
End: 2023-12-01
Payer: MEDICAID

## 2023-12-01 ENCOUNTER — HOSPITAL ENCOUNTER (OUTPATIENT)
Facility: HOSPITAL | Age: 63
Setting detail: OBSERVATION
Discharge: HOME | End: 2023-12-03
Attending: EMERGENCY MEDICINE | Admitting: INTERNAL MEDICINE
Payer: MEDICAID

## 2023-12-01 DIAGNOSIS — U07.1 COVID-19: ICD-10-CM

## 2023-12-01 DIAGNOSIS — R73.9 HYPERGLYCEMIA: ICD-10-CM

## 2023-12-01 DIAGNOSIS — R00.2 PALPITATIONS: ICD-10-CM

## 2023-12-01 DIAGNOSIS — R07.9 CHEST PAIN, UNSPECIFIED TYPE: Primary | ICD-10-CM

## 2023-12-01 LAB
ALBUMIN SERPL-MCNC: 4.5 G/DL (ref 3.5–5.2)
ALBUMIN/GLOB SERPL: 1.7 G/DL
ALP SERPL-CCNC: 101 U/L (ref 39–117)
ALT SERPL W P-5'-P-CCNC: 25 U/L (ref 1–41)
ANION GAP SERPL CALCULATED.3IONS-SCNC: 10 MMOL/L (ref 5–15)
AST SERPL-CCNC: 24 U/L (ref 1–40)
B PARAPERT DNA SPEC QL NAA+PROBE: NOT DETECTED
B PERT DNA SPEC QL NAA+PROBE: NOT DETECTED
BASOPHILS # BLD AUTO: 0.04 10*3/MM3 (ref 0–0.2)
BASOPHILS NFR BLD AUTO: 0.4 % (ref 0–1.5)
BILIRUB SERPL-MCNC: 0.5 MG/DL (ref 0–1.2)
BUN SERPL-MCNC: 16 MG/DL (ref 8–23)
BUN/CREAT SERPL: 16.2 (ref 7–25)
C PNEUM DNA NPH QL NAA+NON-PROBE: NOT DETECTED
CALCIUM SPEC-SCNC: 8.9 MG/DL (ref 8.6–10.5)
CHLORIDE SERPL-SCNC: 106 MMOL/L (ref 98–107)
CO2 SERPL-SCNC: 21 MMOL/L (ref 22–29)
CREAT SERPL-MCNC: 0.99 MG/DL (ref 0.76–1.27)
D DIMER PPP FEU-MCNC: <0.27 MCGFEU/ML (ref 0–0.62)
DEPRECATED RDW RBC AUTO: 41.7 FL (ref 37–54)
EGFRCR SERPLBLD CKD-EPI 2021: 86.1 ML/MIN/1.73
EOSINOPHIL # BLD AUTO: 0.22 10*3/MM3 (ref 0–0.4)
EOSINOPHIL NFR BLD AUTO: 2.2 % (ref 0.3–6.2)
ERYTHROCYTE [DISTWIDTH] IN BLOOD BY AUTOMATED COUNT: 13.5 % (ref 12.3–15.4)
FLUAV SUBTYP SPEC NAA+PROBE: NOT DETECTED
FLUBV RNA ISLT QL NAA+PROBE: NOT DETECTED
GLOBULIN UR ELPH-MCNC: 2.7 GM/DL
GLUCOSE BLDC GLUCOMTR-MCNC: 122 MG/DL (ref 70–130)
GLUCOSE BLDC GLUCOMTR-MCNC: 191 MG/DL (ref 70–130)
GLUCOSE SERPL-MCNC: 327 MG/DL (ref 65–99)
HADV DNA SPEC NAA+PROBE: NOT DETECTED
HCOV 229E RNA SPEC QL NAA+PROBE: NOT DETECTED
HCOV HKU1 RNA SPEC QL NAA+PROBE: NOT DETECTED
HCOV NL63 RNA SPEC QL NAA+PROBE: NOT DETECTED
HCOV OC43 RNA SPEC QL NAA+PROBE: NOT DETECTED
HCT VFR BLD AUTO: 46.7 % (ref 37.5–51)
HGB BLD-MCNC: 15.5 G/DL (ref 13–17.7)
HMPV RNA NPH QL NAA+NON-PROBE: NOT DETECTED
HOLD SPECIMEN: NORMAL
HOLD SPECIMEN: NORMAL
HPIV1 RNA ISLT QL NAA+PROBE: NOT DETECTED
HPIV2 RNA SPEC QL NAA+PROBE: NOT DETECTED
HPIV3 RNA NPH QL NAA+PROBE: NOT DETECTED
HPIV4 P GENE NPH QL NAA+PROBE: NOT DETECTED
IMM GRANULOCYTES # BLD AUTO: 0.05 10*3/MM3 (ref 0–0.05)
IMM GRANULOCYTES NFR BLD AUTO: 0.5 % (ref 0–0.5)
LYMPHOCYTES # BLD AUTO: 1.46 10*3/MM3 (ref 0.7–3.1)
LYMPHOCYTES NFR BLD AUTO: 14.7 % (ref 19.6–45.3)
M PNEUMO IGG SER IA-ACNC: NOT DETECTED
MAGNESIUM SERPL-MCNC: 2 MG/DL (ref 1.6–2.4)
MCH RBC QN AUTO: 28.1 PG (ref 26.6–33)
MCHC RBC AUTO-ENTMCNC: 33.2 G/DL (ref 31.5–35.7)
MCV RBC AUTO: 84.6 FL (ref 79–97)
MONOCYTES # BLD AUTO: 0.48 10*3/MM3 (ref 0.1–0.9)
MONOCYTES NFR BLD AUTO: 4.8 % (ref 5–12)
NEUTROPHILS NFR BLD AUTO: 7.68 10*3/MM3 (ref 1.7–7)
NEUTROPHILS NFR BLD AUTO: 77.4 % (ref 42.7–76)
NRBC BLD AUTO-RTO: 0 /100 WBC (ref 0–0.2)
PLAT MORPH BLD: NORMAL
PLATELET # BLD AUTO: 132 10*3/MM3 (ref 140–450)
PMV BLD AUTO: 10.5 FL (ref 6–12)
POTASSIUM SERPL-SCNC: 4.3 MMOL/L (ref 3.5–5.2)
PROCALCITONIN SERPL-MCNC: 0.08 NG/ML (ref 0–0.25)
PROT SERPL-MCNC: 7.2 G/DL (ref 6–8.5)
QT INTERVAL: 322 MS
QTC INTERVAL: 426 MS
RBC # BLD AUTO: 5.52 10*6/MM3 (ref 4.14–5.8)
RBC MORPH BLD: NORMAL
RHINOVIRUS RNA SPEC NAA+PROBE: NOT DETECTED
RSV RNA NPH QL NAA+NON-PROBE: NOT DETECTED
SARS-COV-2 RNA NPH QL NAA+NON-PROBE: DETECTED
SODIUM SERPL-SCNC: 137 MMOL/L (ref 136–145)
TROPONIN T SERPL HS-MCNC: 9 NG/L
TSH SERPL DL<=0.05 MIU/L-ACNC: 0.33 UIU/ML (ref 0.27–4.2)
WBC MORPH BLD: NORMAL
WBC NRBC COR # BLD AUTO: 9.93 10*3/MM3 (ref 3.4–10.8)
WHOLE BLOOD HOLD COAG: NORMAL
WHOLE BLOOD HOLD SPECIMEN: NORMAL

## 2023-12-01 PROCEDURE — 82948 REAGENT STRIP/BLOOD GLUCOSE: CPT

## 2023-12-01 PROCEDURE — 84145 PROCALCITONIN (PCT): CPT | Performed by: EMERGENCY MEDICINE

## 2023-12-01 PROCEDURE — 0202U NFCT DS 22 TRGT SARS-COV-2: CPT | Performed by: EMERGENCY MEDICINE

## 2023-12-01 PROCEDURE — 25810000003 LACTATED RINGERS SOLUTION: Performed by: EMERGENCY MEDICINE

## 2023-12-01 PROCEDURE — 63710000001 INSULIN LISPRO (HUMAN) PER 5 UNITS: Performed by: INTERNAL MEDICINE

## 2023-12-01 PROCEDURE — 85007 BL SMEAR W/DIFF WBC COUNT: CPT | Performed by: EMERGENCY MEDICINE

## 2023-12-01 PROCEDURE — 71045 X-RAY EXAM CHEST 1 VIEW: CPT

## 2023-12-01 PROCEDURE — 99284 EMERGENCY DEPT VISIT MOD MDM: CPT

## 2023-12-01 PROCEDURE — 80053 COMPREHEN METABOLIC PANEL: CPT | Performed by: EMERGENCY MEDICINE

## 2023-12-01 PROCEDURE — G0378 HOSPITAL OBSERVATION PER HR: HCPCS

## 2023-12-01 PROCEDURE — 93005 ELECTROCARDIOGRAM TRACING: CPT | Performed by: EMERGENCY MEDICINE

## 2023-12-01 PROCEDURE — 25010000002 ENOXAPARIN PER 10 MG: Performed by: INTERNAL MEDICINE

## 2023-12-01 PROCEDURE — 84484 ASSAY OF TROPONIN QUANT: CPT | Performed by: EMERGENCY MEDICINE

## 2023-12-01 PROCEDURE — 85025 COMPLETE CBC W/AUTO DIFF WBC: CPT | Performed by: EMERGENCY MEDICINE

## 2023-12-01 PROCEDURE — 85379 FIBRIN DEGRADATION QUANT: CPT | Performed by: EMERGENCY MEDICINE

## 2023-12-01 PROCEDURE — 84443 ASSAY THYROID STIM HORMONE: CPT | Performed by: EMERGENCY MEDICINE

## 2023-12-01 PROCEDURE — 93005 ELECTROCARDIOGRAM TRACING: CPT

## 2023-12-01 PROCEDURE — 63710000001 INSULIN GLARGINE PER 5 UNITS: Performed by: INTERNAL MEDICINE

## 2023-12-01 PROCEDURE — 96372 THER/PROPH/DIAG INJ SC/IM: CPT

## 2023-12-01 PROCEDURE — 96361 HYDRATE IV INFUSION ADD-ON: CPT

## 2023-12-01 PROCEDURE — 83735 ASSAY OF MAGNESIUM: CPT | Performed by: EMERGENCY MEDICINE

## 2023-12-01 PROCEDURE — 25810000003 SODIUM CHLORIDE 0.9 % SOLUTION: Performed by: INTERNAL MEDICINE

## 2023-12-01 PROCEDURE — 96360 HYDRATION IV INFUSION INIT: CPT

## 2023-12-01 RX ORDER — CETIRIZINE HYDROCHLORIDE 10 MG/1
10 TABLET ORAL DAILY
Status: DISCONTINUED | OUTPATIENT
Start: 2023-12-01 | End: 2023-12-03 | Stop reason: HOSPADM

## 2023-12-01 RX ORDER — NITROGLYCERIN 0.4 MG/1
0.4 TABLET SUBLINGUAL
Status: DISCONTINUED | OUTPATIENT
Start: 2023-12-01 | End: 2023-12-03 | Stop reason: HOSPADM

## 2023-12-01 RX ORDER — ACETAMINOPHEN 650 MG/1
650 SUPPOSITORY RECTAL EVERY 4 HOURS PRN
Status: DISCONTINUED | OUTPATIENT
Start: 2023-12-01 | End: 2023-12-03 | Stop reason: HOSPADM

## 2023-12-01 RX ORDER — BISACODYL 5 MG/1
5 TABLET, DELAYED RELEASE ORAL DAILY PRN
Status: DISCONTINUED | OUTPATIENT
Start: 2023-12-01 | End: 2023-12-03 | Stop reason: HOSPADM

## 2023-12-01 RX ORDER — PREGABALIN 100 MG/1
300 CAPSULE ORAL 2 TIMES DAILY
Status: DISCONTINUED | OUTPATIENT
Start: 2023-12-01 | End: 2023-12-03 | Stop reason: HOSPADM

## 2023-12-01 RX ORDER — ATORVASTATIN CALCIUM 20 MG/1
40 TABLET, FILM COATED ORAL DAILY
Status: DISCONTINUED | OUTPATIENT
Start: 2023-12-01 | End: 2023-12-03 | Stop reason: HOSPADM

## 2023-12-01 RX ORDER — POLYETHYLENE GLYCOL 3350 17 G/17G
17 POWDER, FOR SOLUTION ORAL DAILY PRN
Status: DISCONTINUED | OUTPATIENT
Start: 2023-12-01 | End: 2023-12-03 | Stop reason: HOSPADM

## 2023-12-01 RX ORDER — INSULIN LISPRO 100 [IU]/ML
5 INJECTION, SOLUTION INTRAVENOUS; SUBCUTANEOUS
Status: DISCONTINUED | OUTPATIENT
Start: 2023-12-01 | End: 2023-12-03 | Stop reason: HOSPADM

## 2023-12-01 RX ORDER — SODIUM CHLORIDE 0.9 % (FLUSH) 0.9 %
10 SYRINGE (ML) INJECTION AS NEEDED
Status: DISCONTINUED | OUTPATIENT
Start: 2023-12-01 | End: 2023-12-03 | Stop reason: HOSPADM

## 2023-12-01 RX ORDER — QUETIAPINE FUMARATE 100 MG/1
100 TABLET, FILM COATED ORAL 2 TIMES DAILY
Status: DISCONTINUED | OUTPATIENT
Start: 2023-12-01 | End: 2023-12-03 | Stop reason: HOSPADM

## 2023-12-01 RX ORDER — ACETAMINOPHEN 325 MG/1
650 TABLET ORAL EVERY 6 HOURS PRN
Status: DISCONTINUED | OUTPATIENT
Start: 2023-12-01 | End: 2023-12-01 | Stop reason: SDUPTHER

## 2023-12-01 RX ORDER — AMOXICILLIN 250 MG
2 CAPSULE ORAL 2 TIMES DAILY
Status: DISCONTINUED | OUTPATIENT
Start: 2023-12-01 | End: 2023-12-03 | Stop reason: HOSPADM

## 2023-12-01 RX ORDER — GLIPIZIDE 10 MG/1
10 TABLET ORAL
Status: DISCONTINUED | OUTPATIENT
Start: 2023-12-01 | End: 2023-12-03 | Stop reason: HOSPADM

## 2023-12-01 RX ORDER — ENOXAPARIN SODIUM 100 MG/ML
40 INJECTION SUBCUTANEOUS DAILY
Status: DISCONTINUED | OUTPATIENT
Start: 2023-12-01 | End: 2023-12-03 | Stop reason: HOSPADM

## 2023-12-01 RX ORDER — SUCRALFATE 1 G/1
1 TABLET ORAL
Status: DISCONTINUED | OUTPATIENT
Start: 2023-12-01 | End: 2023-12-03 | Stop reason: HOSPADM

## 2023-12-01 RX ORDER — NICOTINE POLACRILEX 4 MG
15 LOZENGE BUCCAL
Status: DISCONTINUED | OUTPATIENT
Start: 2023-12-01 | End: 2023-12-03 | Stop reason: HOSPADM

## 2023-12-01 RX ORDER — DEXTROSE MONOHYDRATE 25 G/50ML
25 INJECTION, SOLUTION INTRAVENOUS
Status: DISCONTINUED | OUTPATIENT
Start: 2023-12-01 | End: 2023-12-03 | Stop reason: HOSPADM

## 2023-12-01 RX ORDER — PANTOPRAZOLE SODIUM 40 MG/1
40 TABLET, DELAYED RELEASE ORAL
Status: DISCONTINUED | OUTPATIENT
Start: 2023-12-02 | End: 2023-12-03 | Stop reason: HOSPADM

## 2023-12-01 RX ORDER — SODIUM CHLORIDE 9 MG/ML
40 INJECTION, SOLUTION INTRAVENOUS AS NEEDED
Status: DISCONTINUED | OUTPATIENT
Start: 2023-12-01 | End: 2023-12-03 | Stop reason: HOSPADM

## 2023-12-01 RX ORDER — SODIUM CHLORIDE 9 MG/ML
100 INJECTION, SOLUTION INTRAVENOUS CONTINUOUS
Status: DISCONTINUED | OUTPATIENT
Start: 2023-12-01 | End: 2023-12-03 | Stop reason: HOSPADM

## 2023-12-01 RX ORDER — SODIUM CHLORIDE 0.9 % (FLUSH) 0.9 %
10 SYRINGE (ML) INJECTION EVERY 12 HOURS SCHEDULED
Status: DISCONTINUED | OUTPATIENT
Start: 2023-12-01 | End: 2023-12-03 | Stop reason: HOSPADM

## 2023-12-01 RX ORDER — BISACODYL 10 MG
10 SUPPOSITORY, RECTAL RECTAL DAILY PRN
Status: DISCONTINUED | OUTPATIENT
Start: 2023-12-01 | End: 2023-12-03 | Stop reason: HOSPADM

## 2023-12-01 RX ORDER — ALBUTEROL SULFATE 2.5 MG/3ML
2.5 SOLUTION RESPIRATORY (INHALATION) EVERY 6 HOURS PRN
Status: DISCONTINUED | OUTPATIENT
Start: 2023-12-01 | End: 2023-12-03 | Stop reason: HOSPADM

## 2023-12-01 RX ORDER — ONDANSETRON 2 MG/ML
4 INJECTION INTRAMUSCULAR; INTRAVENOUS EVERY 6 HOURS PRN
Status: DISCONTINUED | OUTPATIENT
Start: 2023-12-01 | End: 2023-12-03 | Stop reason: HOSPADM

## 2023-12-01 RX ORDER — ACETAMINOPHEN 325 MG/1
650 TABLET ORAL EVERY 4 HOURS PRN
Status: DISCONTINUED | OUTPATIENT
Start: 2023-12-01 | End: 2023-12-03 | Stop reason: HOSPADM

## 2023-12-01 RX ORDER — IBUPROFEN 600 MG/1
1 TABLET ORAL
Status: DISCONTINUED | OUTPATIENT
Start: 2023-12-01 | End: 2023-12-03 | Stop reason: HOSPADM

## 2023-12-01 RX ORDER — ACETAMINOPHEN 160 MG/5ML
650 SOLUTION ORAL EVERY 4 HOURS PRN
Status: DISCONTINUED | OUTPATIENT
Start: 2023-12-01 | End: 2023-12-03 | Stop reason: HOSPADM

## 2023-12-01 RX ORDER — INSULIN LISPRO 100 [IU]/ML
2-9 INJECTION, SOLUTION INTRAVENOUS; SUBCUTANEOUS
Status: DISCONTINUED | OUTPATIENT
Start: 2023-12-01 | End: 2023-12-03 | Stop reason: HOSPADM

## 2023-12-01 RX ADMIN — QUETIAPINE FUMARATE 100 MG: 100 TABLET ORAL at 22:11

## 2023-12-01 RX ADMIN — CETIRIZINE HYDROCHLORIDE 10 MG: 10 TABLET ORAL at 22:11

## 2023-12-01 RX ADMIN — Medication 10 ML: at 22:23

## 2023-12-01 RX ADMIN — PREGABALIN 300 MG: 100 CAPSULE ORAL at 22:11

## 2023-12-01 RX ADMIN — SUCRALFATE 1 G: 1 TABLET ORAL at 22:11

## 2023-12-01 RX ADMIN — SODIUM CHLORIDE, POTASSIUM CHLORIDE, SODIUM LACTATE AND CALCIUM CHLORIDE 1000 ML: 600; 310; 30; 20 INJECTION, SOLUTION INTRAVENOUS at 12:58

## 2023-12-01 RX ADMIN — DOCUSATE SODIUM 50MG AND SENNOSIDES 8.6MG 2 TABLET: 8.6; 5 TABLET, FILM COATED ORAL at 22:11

## 2023-12-01 RX ADMIN — ATORVASTATIN CALCIUM 40 MG: 20 TABLET, FILM COATED ORAL at 18:21

## 2023-12-01 RX ADMIN — INSULIN GLARGINE 25 UNITS: 100 INJECTION, SOLUTION SUBCUTANEOUS at 22:10

## 2023-12-01 RX ADMIN — ENOXAPARIN SODIUM 40 MG: 100 INJECTION SUBCUTANEOUS at 18:21

## 2023-12-01 RX ADMIN — INSULIN LISPRO 2 UNITS: 100 INJECTION, SOLUTION INTRAVENOUS; SUBCUTANEOUS at 21:10

## 2023-12-01 RX ADMIN — SODIUM CHLORIDE 100 ML/HR: 9 INJECTION, SOLUTION INTRAVENOUS at 18:21

## 2023-12-01 RX ADMIN — GLIPIZIDE 10 MG: 10 TABLET ORAL at 22:11

## 2023-12-01 NOTE — H&P
Internal medicine history and physical  INTERNAL MEDICINE   [facility]       Patient Identification:  Name: Saul Berrios  Age: 63 y.o.  Sex: male  :  1960  MRN: [id number]                   Primary Care Physician: Betsy Vega APRN                               Date of admission:2023  Patient is Welsh speaking and also speaks apparently.  Source of information patient's family member can speak English and my use of the language to get more information.  Chief Complaint: Presented to the emergency room with worsening shortness of breath palpitation and cough worse over the course of last 5 days.    History of Present Illness:   Patient is a 63-year-old Welsh speaking male with history of diabetes hypertension dyslipidemia has been battling off-and-on fever fatigue and tiredness and cough and shortness of breath over the course of last 3 months.  Patient was eventually evaluated by [facility] physician and was seen in the emergency room also afterwards on 10/26/2023 for similar symptoms and was noted to have pneumonia for which she was given 5 dose of antibiotic.  And was also given DuoNeb and mental prednisone injection in the ER.  According to the patient and the family member he did not improve despite completing his antibiotic treatment.  Because of his persistent symptoms he went to his primary care physician and had an EKG performed which showed findings were for ischemic changes in the inferior leads.  Evaluation in the emergency room revealed poorly controlled diabetes with blood sugar 327 respiratory viral panel positive for COVID-19 infection without any hypoxia.  Patient is being admitted for blood sugar control electrolyte imbalance correction for hydration and to decide whether or not he would like COVID-specific treatment in this situation.  Patient claims that he is feeling somewhat better.      Past Medical History:  Past Medical History:   Diagnosis Date     Arthritis     Diabetes mellitus     Hyperlipidemia      Past Surgical History:  Past Surgical History:   Procedure Laterality Date    ENDOSCOPY N/A 12/7/2022    Procedure: ESOPHAGOGASTRODUODENOSCOPY with bx;  Surgeon: Martin Metcalf MD;  Location: [facility] ENDOSCOPY;  Service: Gastroenterology;  Laterality: N/A;  pre: LUQ pain  post: Esophagitis, gastritis, duodenitis     UPPER GASTROINTESTINAL ENDOSCOPY  08/2021      Home Meds:  Medications Prior to Admission   Medication Sig Dispense Refill Last Dose    acetaminophen (TYLENOL) 325 MG tablet Take 2 tablets by mouth Every 6 (Six) Hours As Needed for Mild Pain.       atorvastatin (LIPITOR) 40 MG tablet Take 1 tablet by mouth Daily.       cetirizine (zyrTEC) 10 MG tablet Take 1 tablet by mouth Daily.       Dulaglutide (Trulicity) 1.5 MG/0.5ML solution pen-injector Inject  under the skin into the appropriate area as directed.       Dulaglutide 0.75 MG/0.5ML solution pen-injector Inject  under the skin into the appropriate area as directed.       Empagliflozin (JARDIANCE PO) Take  by mouth.       glipizide (GLUCOTROL) 10 MG tablet Take 1 tablet by mouth 2 (Two) Times a Day Before Meals. Take 2 tabs daily       insulin degludec (Tresiba FlexTouch) 100 UNIT/ML solution pen-injector injection Inject  under the skin into the appropriate area as directed.       Insulin Glargine-Lixisenatide (Soliqua) 100-33 UNT-MCG/ML solution pen-injector injection Inject  under the skin into the appropriate area as directed.       nateglinide (STARLIX) 60 MG tablet Take 1 tablet by mouth 3 (Three) Times a Day Before Meals.       omeprazole (priLOSEC) 40 MG capsule Take 1 capsule by mouth Daily.       pregabalin (LYRICA) 100 MG capsule Take 3 capsules by mouth 2 (Two) Times a Day.       QUEtiapine (SEROquel) 100 MG tablet Take 1 tablet by mouth 2 (Two) Times a Day. bid       Stiolto Respimat 2.5-2.5 MCG/ACT aerosol solution inhaler        sucralfate (Carafate) 1 GM/10ML suspension Take  10 mL by mouth 4 (Four) Times a Day. 1200 mL 1     Ventolin  (90 Base) MCG/ACT inhaler         Current Meds:     Current Facility-Administered Medications:     influenza vac split quad (FLUZONE,FLUARIX,AFLURIA,FLULAVAL) injection 0.5 mL, 0.5 mL, Intramuscular, During Hospitalization, Davida Fisher MD    sodium chloride 0.9 % flush 10 mL, 10 mL, Intravenous, PRN, Alphonse Cartwright MD  Allergies:  No Known Allergies  Social History:   Social History     Tobacco Use    Smoking status: Some Days     Types: Pipe    Smokeless tobacco: Current     Types: Chew   Substance Use Topics    Alcohol use: Not Currently      Family History:  History reviewed. No pertinent family history.       Review of Systems  See history of present illness and past medical history.   Constitutional:  Positive for fatigue and fever. Negative for activity change.   HENT: Negative.     Eyes:  Negative for pain and visual disturbance.   Respiratory:  Positive for shortness of breath. Negative for cough.    Cardiovascular:  Positive for chest pain and palpitations.   Gastrointestinal:  Negative for abdominal pain.   Genitourinary:  Negative for dysuria.   Skin:  Negative for color change.   Neurological:  Negative for syncope and headaches.   All other systems reviewed and are negative.  Vitals:   /83 (BP Location: Right arm, Patient Position: Lying)   Pulse 91   Temp 97.2 °F (36.2 °C) (Oral)   Resp 20   SpO2 99%   I/O: No intake or output data in the 24 hours ending 12/01/23 1800  Exam:  Patient is examined using the personal protective equipment as per guidelines from infection control for this particular patient as enacted.  Hand washing was performed before and after patient interaction.  General Appearance:  Alert cooperative ill-appearing Salvadorean speaking male who does not appear to be in any acute distress.   Head:    Normocephalic, without obvious abnormality, atraumatic   Eyes:    PERRL, conjunctiva/corneas clear, EOM's  intact, both eyes   Ears:    Normal external ear canals, both ears   Nose:   Nares normal, septum midline, mucosa normal, no drainage    or sinus tenderness   Throat:   Lips, tongue, gums normal; oral mucosa pink and moist   Neck:   Supple, symmetrical, trachea midline, no adenopathy;     thyroid:  no enlargement/tenderness/nodules; no carotid    bruit or JVD   Back:     Symmetric, no curvature, ROM normal, no CVA tenderness   Lungs:   Decreased breath sounds bilaterally   Chest Wall:    No tenderness or deformity    Heart:  S1-S2 regular.  Earlier in the emergency room patient was tachycardic.   Abdomen:   Soft nontender   Extremities:   Extremities normal, atraumatic, no cyanosis or edema   Pulses:   Pulses palpable in all extremities; symmetric all extremities   Skin:   Skin color normal, Skin is warm and dry,  no rashes or palpable lesions   Neurologic: Alert and oriented x 3       Data Review:      I reviewed the patient's new clinical results.  Results from last 7 days   Lab Units 12/01/23  1248   WBC 10*3/mm3 9.93   HEMOGLOBIN g/dL 15.5   PLATELETS 10*3/mm3 132*     Results from last 7 days   Lab Units 12/01/23  1248   SODIUM mmol/L 137   POTASSIUM mmol/L 4.3   CHLORIDE mmol/L 106   CO2 mmol/L 21.0*   BUN mg/dL 16   CREATININE mg/dL 0.99   CALCIUM mg/dL 8.9   GLUCOSE mg/dL 327*     Microbiology Results (last 10 days)       Procedure Component Value - Date/Time    Respiratory Panel PCR w/COVID-19(SARS-CoV-2) LALITA/JESÚS/PABLO/PAD/COR/VIVEK In-House, NP Swab in UTM/VTM, 2 HR TAT - Swab, Nasopharynx [[id number]]  (Abnormal) Collected: 12/01/23 1300    Lab Status: Final result Specimen: Swab from Nasopharynx Updated: 12/01/23 1433     ADENOVIRUS, PCR Not Detected     Coronavirus 229E Not Detected     Coronavirus HKU1 Not Detected     Coronavirus NL63 Not Detected     Coronavirus OC43 Not Detected     COVID19 Detected     Human Metapneumovirus Not Detected     Human Rhinovirus/Enterovirus Not Detected     Influenza A PCR  Not Detected     Influenza B PCR Not Detected     Parainfluenza Virus 1 Not Detected     Parainfluenza Virus 2 Not Detected     Parainfluenza Virus 3 Not Detected     Parainfluenza Virus 4 Not Detected     RSV, PCR Not Detected     Bordetella pertussis pcr Not Detected     Bordetella parapertussis PCR Not Detected     Chlamydophila pneumoniae PCR Not Detected     Mycoplasma pneumo by PCR Not Detected    Narrative:      In the setting of a positive respiratory panel with a viral infection PLUS a negative procalcitonin without other underlying concern for bacterial infection, consider observing off antibiotics or discontinuation of antibiotics and continue supportive care. If the respiratory panel is positive for atypical bacterial infection (Bordetella pertussis, Chlamydophila pneumoniae, or Mycoplasma pneumoniae), consider antibiotic de-escalation to target atypical bacterial infection.            Assessment:  Active Hospital Problems    Diagnosis  POA    **Chest pain [R07.9]  Yes    DM (diabetes mellitus), type 2 [E11.9]  Yes    HTN (hypertension) [I10]  Yes    Thrombocytopenia [D69.6]  Yes       Medical decision making/care plan:See admitting orders  Progressive fatigue weakness shortness of breath 19 assay-this likely represents systemic COVID-19 infection without hypoxia with duration of illness more than 7 days.  Plan is to provide supportive care continue with infection control measures watch for need for oxygen supplementation and correct his electrolytes.  Patient already feeling better after initial fluid and electrolyte replacement in the emergency room.  Diabetes mellitus-continue with Accu-Cheks and sliding scale coverage check hemoglobin A1c and watch for hypoglycemia.  Provide him with long-acting and short acting insulin with sliding scale coverage.  Hypertension-continue antihypertensive regimen and avoid hypotensive episode.  Davida Fisher MD   12/1/2023  18:00 EST    Parts of this note may be an  electronic transcription/translation of spoken language to printed text using the Dragon dictation system.

## 2023-12-01 NOTE — PLAN OF CARE
Goal Outcome Evaluation:  Plan of Care Reviewed With: patient        Progress: improving  Outcome Evaluation: Pt admitted from ER with chest pain. Patient does not report increase or change in chest pain. Consistent with pain from last few months. Pt VSS. Pt A&Ox4.  ipad used. Patient speaks a little english, but mainly fluent in Thai. IV fluids intiated. Admission orders placed. ACHS. Blood sugar stable. Sinus rhythm to sinus tach on monitor. Pt is up ad neo.

## 2023-12-01 NOTE — ED PROVIDER NOTES
EMERGENCY DEPARTMENT ENCOUNTER    Room Number:  30/30  Date seen:  12/1/2023  PCP: Betsy Vega APRN  Historian(s): Patient (via )      HPI:  Chief Complaint: Palpitations, fever, chest discomfort  A complete HPI/ROS/PMH/PSH/SH/FH are unobtainable / limited due to: language barrier  Context: Saul Berrios is a 63 y.o. male who presents to the ED directly from primary care office for further evaluation because of chest discomfort complaints and in outpatient abnormal EKG. patient says that he has had recurrent fevers for nearly 3 months now.  A few months ago he went to [facility] for evaluation because of the fever problem.  They prescribed him an antibiotic which she took for 5 days.  However the fevers have not subsided.  Additionally he has developed persistent palpitations with some chest discomfort and shortness of breath feeling.  The palpitations continue to recur and intermittent periods of time.  This morning he went to a primary care clinic for evaluation.  He had an EKG performed there which showed some worrisome abnormalities of T wave inversions in the inferior leads.  Therefore they advised him to come here for further cardiac workup.  Patient has never had any known history of CAD.  He is never had a stress test in the past either.        PAST MEDICAL HISTORY  Active Ambulatory Problems     Diagnosis Date Noted    Community acquired pneumonia, unspecified laterality 03/26/2022    DM (diabetes mellitus), type 2 03/26/2022    HTN (hypertension) 03/26/2022    GERD without esophagitis 03/26/2022    Thrombocytopenia 03/26/2022    Fever 03/27/2022    Thyroid nodule 04/03/2022    Adrenal nodule 04/03/2022    Left upper quadrant abdominal pain 06/07/2022     Resolved Ambulatory Problems     Diagnosis Date Noted    No Resolved Ambulatory Problems     Past Medical History:   Diagnosis Date    Arthritis     Diabetes mellitus     Hyperlipidemia          PAST SURGICAL HISTORY  Past Surgical  History:   Procedure Laterality Date    ENDOSCOPY N/A 12/7/2022    Procedure: ESOPHAGOGASTRODUODENOSCOPY with bx;  Surgeon: Martin Metcalf MD;  Location: [facility] ENDOSCOPY;  Service: Gastroenterology;  Laterality: N/A;  pre: LUQ pain  post: Esophagitis, gastritis, duodenitis     UPPER GASTROINTESTINAL ENDOSCOPY  08/2021         FAMILY HISTORY  No family history on file.      SOCIAL HISTORY  Social History     Socioeconomic History    Marital status:    Tobacco Use    Smoking status: Some Days     Types: Pipe    Smokeless tobacco: Never   Vaping Use    Vaping Use: Every day   Substance and Sexual Activity    Alcohol use: Not Currently    Drug use: Not Currently    Sexual activity: Defer         ALLERGIES  Patient has no known allergies.        REVIEW OF SYSTEMS  Review of Systems   Constitutional:  Positive for fatigue and fever. Negative for activity change.   HENT: Negative.     Eyes:  Negative for pain and visual disturbance.   Respiratory:  Positive for shortness of breath. Negative for cough.    Cardiovascular:  Positive for chest pain and palpitations.   Gastrointestinal:  Negative for abdominal pain.   Genitourinary:  Negative for dysuria.   Skin:  Negative for color change.   Neurological:  Negative for syncope and headaches.   All other systems reviewed and are negative.           PHYSICAL EXAM  ED Triage Vitals   Temp Heart Rate Resp BP SpO2   12/01/23 1141 12/01/23 1141 12/01/23 1145 12/01/23 1155 12/01/23 1141   98.9 °F (37.2 °C) (!) 125 20 124/80 98 %      Temp src Heart Rate Source Patient Position BP Location FiO2 (%)   12/01/23 1141 12/01/23 1141 -- -- --   Tympanic Monitor          Physical Exam      GENERAL: Calm, no diaphoresis, no acute distress  HENT: nares patent, normocephalic and atraumatic  EYES: no scleral icterus, EOMI, normal conjunctivae  CV: regular rhythm, normal rate in the 90s to low 100s range, normal distal pulses no murmurs  RESPIRATORY: normal effort, no stridor,  lungs clear to auscultation bilaterally  ABDOMEN: soft, nontender in all quadrants  MUSCULOSKELETAL: no deformity, no asymmetry, no edema to the extremities  NEURO: alert, moves all extremities, follows commands  PSYCH:  calm, cooperative  SKIN: warm, dry    Vital signs and nursing notes reviewed.        LAB RESULTS  Recent Results (from the past 24 hour(s))   ECG 12 Lead ED Triage Standing Order; Dysrhythmia    Collection Time: 12/01/23 11:46 AM   Result Value Ref Range    QT Interval 322 ms    QTC Interval 426 ms   Comprehensive Metabolic Panel    Collection Time: 12/01/23 12:48 PM    Specimen: Blood   Result Value Ref Range    Glucose 327 (H) 65 - 99 mg/dL    BUN 16 8 - 23 mg/dL    Creatinine 0.99 0.76 - 1.27 mg/dL    Sodium 137 136 - 145 mmol/L    Potassium 4.3 3.5 - 5.2 mmol/L    Chloride 106 98 - 107 mmol/L    CO2 21.0 (L) 22.0 - 29.0 mmol/L    Calcium 8.9 8.6 - 10.5 mg/dL    Total Protein 7.2 6.0 - 8.5 g/dL    Albumin 4.5 3.5 - 5.2 g/dL    ALT (SGPT) 25 1 - 41 U/L    AST (SGOT) 24 1 - 40 U/L    Alkaline Phosphatase 101 39 - 117 U/L    Total Bilirubin 0.5 0.0 - 1.2 mg/dL    Globulin 2.7 gm/dL    A/G Ratio 1.7 g/dL    BUN/Creatinine Ratio 16.2 7.0 - 25.0    Anion Gap 10.0 5.0 - 15.0 mmol/L    eGFR 86.1 >60.0 mL/min/1.73   Magnesium    Collection Time: 12/01/23 12:48 PM    Specimen: Blood   Result Value Ref Range    Magnesium 2.0 1.6 - 2.4 mg/dL   Single High Sensitivity Troponin T    Collection Time: 12/01/23 12:48 PM    Specimen: Blood   Result Value Ref Range    HS Troponin T 9 <22 ng/L   TSH    Collection Time: 12/01/23 12:48 PM    Specimen: Blood   Result Value Ref Range    TSH 0.330 0.270 - 4.200 uIU/mL   Green Top (Gel)    Collection Time: 12/01/23 12:48 PM   Result Value Ref Range    Extra Tube Hold for add-ons.    Lavender Top    Collection Time: 12/01/23 12:48 PM   Result Value Ref Range    Extra Tube hold for add-on    Gold Top - SST    Collection Time: 12/01/23 12:48 PM   Result Value Ref Range     Extra Tube Hold for add-ons.    Light Blue Top    Collection Time: 12/01/23 12:48 PM   Result Value Ref Range    Extra Tube Hold for add-ons.    CBC Auto Differential    Collection Time: 12/01/23 12:48 PM    Specimen: Blood   Result Value Ref Range    WBC 9.93 3.40 - 10.80 10*3/mm3    RBC 5.52 4.14 - 5.80 10*6/mm3    Hemoglobin 15.5 13.0 - 17.7 g/dL    Hematocrit 46.7 37.5 - 51.0 %    MCV 84.6 79.0 - 97.0 fL    MCH 28.1 26.6 - 33.0 pg    MCHC 33.2 31.5 - 35.7 g/dL    RDW 13.5 12.3 - 15.4 %    RDW-SD 41.7 37.0 - 54.0 fl    MPV 10.5 6.0 - 12.0 fL    Platelets 132 (L) 140 - 450 10*3/mm3    Neutrophil % 77.4 (H) 42.7 - 76.0 %    Lymphocyte % 14.7 (L) 19.6 - 45.3 %    Monocyte % 4.8 (L) 5.0 - 12.0 %    Eosinophil % 2.2 0.3 - 6.2 %    Basophil % 0.4 0.0 - 1.5 %    Immature Grans % 0.5 0.0 - 0.5 %    Neutrophils, Absolute 7.68 (H) 1.70 - 7.00 10*3/mm3    Lymphocytes, Absolute 1.46 0.70 - 3.10 10*3/mm3    Monocytes, Absolute 0.48 0.10 - 0.90 10*3/mm3    Eosinophils, Absolute 0.22 0.00 - 0.40 10*3/mm3    Basophils, Absolute 0.04 0.00 - 0.20 10*3/mm3    Immature Grans, Absolute 0.05 0.00 - 0.05 10*3/mm3    nRBC 0.0 0.0 - 0.2 /100 WBC   Procalcitonin    Collection Time: 12/01/23 12:48 PM    Specimen: Blood   Result Value Ref Range    Procalcitonin 0.08 0.00 - 0.25 ng/mL   Scan Slide    Collection Time: 12/01/23 12:48 PM    Specimen: Blood   Result Value Ref Range    RBC Morphology Normal Normal    WBC Morphology Normal Normal    Platelet Morphology Normal Normal   D-dimer, Quantitative    Collection Time: 12/01/23 12:48 PM    Specimen: Blood   Result Value Ref Range    D-Dimer, Quantitative <0.27 0.00 - 0.62 MCGFEU/mL   Respiratory Panel PCR w/COVID-19(SARS-CoV-2) LALITA/JESÚS/PABLO/PAD/COR/VIVEK In-House, NP Swab in UTM/VTM, 2 HR TAT - Swab, Nasopharynx    Collection Time: 12/01/23  1:00 PM    Specimen: Nasopharynx; Swab   Result Value Ref Range    ADENOVIRUS, PCR Not Detected Not Detected    Coronavirus 229E Not Detected Not Detected     Coronavirus HKU1 Not Detected Not Detected    Coronavirus NL63 Not Detected Not Detected    Coronavirus OC43 Not Detected Not Detected    COVID19 Detected (C) Not Detected - Ref. Range    Human Metapneumovirus Not Detected Not Detected    Human Rhinovirus/Enterovirus Not Detected Not Detected    Influenza A PCR Not Detected Not Detected    Influenza B PCR Not Detected Not Detected    Parainfluenza Virus 1 Not Detected Not Detected    Parainfluenza Virus 2 Not Detected Not Detected    Parainfluenza Virus 3 Not Detected Not Detected    Parainfluenza Virus 4 Not Detected Not Detected    RSV, PCR Not Detected Not Detected    Bordetella pertussis pcr Not Detected Not Detected    Bordetella parapertussis PCR Not Detected Not Detected    Chlamydophila pneumoniae PCR Not Detected Not Detected    Mycoplasma pneumo by PCR Not Detected Not Detected       Ordered the above labs and reviewed the results.        RADIOLOGY  XR Chest 1 View    Result Date: 12/1/2023  XR CHEST 1 VW-12/1/2023  HISTORY: Dysrhythmia.  Heart size is within normal limits. Lungs are slightly underinflated but appear clear. Bones and soft tissues are unremarkable.      1. No acute process.   This report was finalized on 12/1/2023 12:32 PM by Dr. Rob Llanos M.D on Workstation: [employer]       Ordered the above noted radiological studies. Reviewed by me in PACS.          PROCEDURES  Procedures        MEDICATIONS GIVEN IN ER  Medications   sodium chloride 0.9 % flush 10 mL (has no administration in time range)   lactated ringers bolus 1,000 mL (0 mL Intravenous Stopped 12/1/23 1328)           MEDICAL DECISION MAKING, PROGRESS, and CONSULTS    All labs have been independently reviewed by me.  All radiology studies have been reviewed by me and I have also reviewed the radiology report.   EKG's independently viewed and interpreted by me.  Discussion below represents my analysis of pertinent findings related to patient's condition, differential  diagnosis, treatment plan and final disposition.    Heart score: 3    Additional sources:    - External (non-ED) record review: I reviewed the GI office progress note from May 2, 2023 when he had follow-up after an EGD procedure.  Plan to continue with PPI and Carafate.      Orders placed during this visit:  Orders Placed This Encounter   Procedures    Respiratory Panel PCR w/COVID-19(SARS-CoV-2) LALITA/JESÚS/PABLO/PAD/COR/VIVEK In-House, NP Swab in UTM/VTM, 2 HR TAT - Swab, Nasopharynx    XR Chest 1 View    [place] Draw    Comprehensive Metabolic Panel    Magnesium    Single High Sensitivity Troponin T    TSH    CBC Auto Differential    Procalcitonin    Scan Slide    D-dimer, Quantitative    NPO Diet NPO Type: Strict NPO    Undress & Gown    Monitor Blood Pressure    Pulse Oximetry, Continuous    LHA (on-call MD unless specified) Details    Oxygen Therapy- Nasal Cannula; Titrate 1-6 LPM Per SpO2; 90 - 95%    ECG 12 Lead ED Triage Standing Order; Dysrhythmia    Insert Peripheral IV    Initiate Observation Status    CBC & Differential    Green Top (Gel)    Lavender Top    Gold Top - SST    Light Blue Top         Differential diagnosis includes but is not limited to:    Acute MI, GERD, pulmonary realism, aortic dissection, pneumonia, arrhythmia, viral illness      Independent interpretation of labs, radiology studies, and discussions with consultants:  ED Course as of 12/01/23 1544   Fri Dec 01, 2023   1248 I independently interpreted the Chest X-ray and my findings are: No Pneumothorax, No Effusion, No Infiltrate   [LILY]   1248 EKG           EKG time/Interp time: 1146/1153  Rhythm/Rate: Sinus tachycardia, 105 bpm  P waves and IA: Present, 157 ms  QRS, axis: 90 ms, normal axis  ST and T waves: No ST segment elevations are present.    Independently interpreted by me contemporaneously with treatment     [LILY]   1359 Glucose(!): 327 [LILY]   1417 D-Dimer, Quant: <0.27 [LILY]   1417 HS Troponin T: 9  D-dimer is normal range and  therefore I think pulmonary embolism is unlikely diagnosis.  No need for CT angiogram at this time. [LILY]   1435 COVID19(!!): Detected [LILY]      ED Course User Index  [LILY] Alphonse Cartwright MD         DIAGNOSIS  Final diagnoses:   Chest pain, unspecified type   Palpitations   Hyperglycemia   COVID-19         DISPOSITION  Admit to A, telemetry        Latest Documented Vital Signs:  As of 15:44 EST  BP- 120/76 HR- 96 Temp- 98.9 °F (37.2 °C) (Tympanic) O2 sat- 97%              --    Please note that portions of this were completed with a voice recognition program.       Note Disclaimer: At [facility], we believe that sharing information builds trust and better relationships. You are receiving this note because you are receiving care at [facility] or recently visited. It is possible you will see health information before a provider has talked with you about it. This kind of information can be easy to misunderstand. To help you fully understand what it means for your health, we urge you to discuss this note with your provider.             Alphonse Cartwright MD  12/01/23 5233

## 2023-12-01 NOTE — ED NOTES
Nursing report ED to floor  Saul Berrios  63 y.o.  male    HPI (triage note):   Chief Complaint   Patient presents with    Rapid Heart Rate       Saul Berrios is a 63 y.o. male who presents to the ED directly from primary care office for further evaluation because of chest discomfort complaints and in outpatient abnormal EKG. patient says that he has had recurrent fevers for nearly 3 months now.  A few months ago he went to [facility] for evaluation because of the fever problem.  They prescribed him an antibiotic which she took for 5 days.  However the fevers have not subsided.  Additionally he has developed persistent palpitations with some chest discomfort and shortness of breath feeling.  The palpitations continue to recur and intermittent periods of time.  This morning he went to a primary care clinic for evaluation.  He had an EKG performed there which showed some worrisome abnormalities of T wave inversions in the inferior leads.  Therefore they advised him to come here for further cardiac workup.  Patient has never had any known history of CAD.  He is never had a stress test in the past either.     Admitting doctor:   Davida Fisher MD    Admitting diagnosis:   The primary encounter diagnosis was Chest pain, unspecified type. Diagnoses of Palpitations, Hyperglycemia, and COVID-19 were also pertinent to this visit.    Code status:   Current Code Status       Date Active Code Status Order ID Comments User Context       Prior            Allergies:   Patient has no known allergies.    Past Medical History:  Past Medical History:   Diagnosis Date    Arthritis     Diabetes mellitus     Hyperlipidemia         Weight:   There were no vitals filed for this visit.    Most recent vitals:   Vitals:    12/01/23 1415 12/01/23 1430 12/01/23 1441 12/01/23 1445   BP:   120/76    Pulse: 99 100 97 96   Resp:       Temp:       TempSrc:       SpO2: 98% 97% 98% 97%       Active LDAs/IV Access:   Lines, Drains & Airways        Active LDAs       Name Placement date Placement time Site Days    Peripheral IV 12/01/23 1251 Right Antecubital 12/01/23  1251  Antecubital  less than 1                    Labs (abnormal labs have a star):   Labs Reviewed   RESPIRATORY PANEL PCR W/ COVID-19 (SARS-COV-2), NP SWAB IN UTM/VTP, 2 HR TAT - Abnormal; Notable for the following components:       Result Value    COVID19 Detected (*)     All other components within normal limits    Narrative:     In the setting of a positive respiratory panel with a viral infection PLUS a negative procalcitonin without other underlying concern for bacterial infection, consider observing off antibiotics or discontinuation of antibiotics and continue supportive care. If the respiratory panel is positive for atypical bacterial infection (Bordetella pertussis, Chlamydophila pneumoniae, or Mycoplasma pneumoniae), consider antibiotic de-escalation to target atypical bacterial infection.   COMPREHENSIVE METABOLIC PANEL - Abnormal; Notable for the following components:    Glucose 327 (*)     CO2 21.0 (*)     All other components within normal limits    Narrative:     GFR Normal >60  Chronic Kidney Disease <60  Kidney Failure <15     CBC WITH AUTO DIFFERENTIAL - Abnormal; Notable for the following components:    Platelets 132 (*)     Neutrophil % 77.4 (*)     Lymphocyte % 14.7 (*)     Monocyte % 4.8 (*)     Neutrophils, Absolute 7.68 (*)     All other components within normal limits   MAGNESIUM - Normal   SINGLE HSTROPONIN T - Normal    Narrative:     High Sensitive Troponin T Reference Range:  <14.0 ng/L- Negative Female for AMI  <22.0 ng/L- Negative Male for AMI  >=14 - Abnormal Female indicating possible myocardial injury.  >=22 - Abnormal Male indicating possible myocardial injury.   Clinicians would have to utilize clinical acumen, EKG, Troponin, and serial changes to determine if it is an Acute Myocardial Infarction or myocardial injury due to an underlying chronic  "condition.        TSH - Normal   PROCALCITONIN - Normal    Narrative:     As a Marker for Sepsis (Non-Neonates):    1. <0.5 ng/mL represents a low risk of severe sepsis and/or septic shock.  2. >2 ng/mL represents a high risk of severe sepsis and/or septic shock.    As a Marker for Lower Respiratory Tract Infections that require antibiotic therapy:    PCT on Admission    Antibiotic Therapy       6-12 Hrs later    >0.5                Strongly Recommended  >0.25 - <0.5        Recommended   0.1 - 0.25          Discouraged              Remeasure/reassess PCT  <0.1                Strongly Discouraged     Remeasure/reassess PCT    As 28 day mortality risk marker: \"Change in Procalcitonin Result\" (>80% or <=80%) if Day 0 (or Day 1) and Day 4 values are available. Refer to [email or web address]    Change in PCT <=80%  A decrease of PCT levels below or equal to 80% defines a positive change in PCT test result representing a higher risk for 28-day all-cause mortality of patients diagnosed with severe sepsis for septic shock.    Change in PCT >80%  A decrease of PCT levels of more than 80% defines a negative change in PCT result representing a lower risk for 28-day all-cause mortality of patients diagnosed with severe sepsis or septic shock.      SCAN SLIDE - Normal   D-DIMER, QUANTITATIVE - Normal    Narrative:     According to the assay 's published package insert, a normal (<0.50 MCGFEU/mL) D-dimer result in conjunction with a non-high clinical probability assessment, excludes deep vein thrombosis (DVT) and pulmonary embolism (PE) with high sensitivity.    D-dimer values increase with age and this can make VTE exclusion of an older population difficult. To address this, the American College of Phys[employer], based on best available evidence and recent guidelines, recommends that clinicians use age-adjusted D-dimer thresholds in patients greater than 50 years of age with: a) a low probability of PE " "who do not meet all Pulmonary Embolism Rule Out Criteria, or b) in those with intermediate probability of PE.   The formula for an age-adjusted D-dimer cut-off is \"age/100\".  For example, a 60 year old patient would have an age-adjusted cut-off of 0.60 MCGFEU/mL and an 80 year old 0.80 MCGFEU/mL.   RAINBOW DRAW    Narrative:     The following orders were created for panel order [place] Draw.  Procedure                               Abnormality         Status                     ---------                               -----------         ------                     Green Top (Gel)[[id number]]                                  Final result               Lavender Top[[id number]]                                     Final result               Gold Top - SST[[id number]]                                   Final result               Light Blue Top[[id number]]                                   Final result                 Please view results for these tests on the individual orders.   CBC AND DIFFERENTIAL    Narrative:     The following orders were created for panel order CBC & Differential.  Procedure                               Abnormality         Status                     ---------                               -----------         ------                     CBC Auto Differential[[id number]]        Abnormal            Final result               Scan Slide[[id number]]                   Normal              Final result                 Please view results for these tests on the individual orders.   GREEN TOP   LAVENDER TOP   GOLD TOP - SST   LIGHT BLUE TOP       EKG:   ECG 12 Lead ED Triage Standing Order; Dysrhythmia   Preliminary Result   HEART RATE= 105  bpm   RR Interval= 571  ms   ND Interval= 157  ms   P Horizontal Axis= 4  deg   P Front Axis= 41  deg   QRSD Interval= 90  ms   QT Interval= 322  ms   QTcB= 426  ms   QRS Axis= 64  deg   T Wave Axis= 42  deg   - OTHERWISE NORMAL ECG -   Sinus tachycardia   Electronically Signed " By:    Date and Time of Study: 2023-12-01 11:46:14          Meds given in ED:   Medications   sodium chloride 0.9 % flush 10 mL (has no administration in time range)   lactated ringers bolus 1,000 mL (0 mL Intravenous Stopped 12/1/23 1328)       Imaging results:  XR Chest 1 View    Result Date: 12/1/2023  1. No acute process.   This report was finalized on 12/1/2023 12:32 PM by Dr. Rob Llanos M.D on Workstation: [id number]       Ambulatory status:   - ad neo    Social issues:   Social History     Socioeconomic History    Marital status:    Tobacco Use    Smoking status: Some Days     Types: Pipe    Smokeless tobacco: Never   Vaping Use    Vaping Use: Every day   Substance and Sexual Activity    Alcohol use: Not Currently    Drug use: Not Currently    Sexual activity: Defer          NIH Stroke Scale:         Darryn Pelletier RN  12/01/23 14:54 EST    Nurse Direct line for any questions: [phone]

## 2023-12-02 LAB
ALBUMIN SERPL-MCNC: 3.6 G/DL (ref 3.5–5.2)
ALBUMIN/GLOB SERPL: 1.6 G/DL
ALP SERPL-CCNC: 89 U/L (ref 39–117)
ALT SERPL W P-5'-P-CCNC: 24 U/L (ref 1–41)
ANION GAP SERPL CALCULATED.3IONS-SCNC: 11.3 MMOL/L (ref 5–15)
AST SERPL-CCNC: 22 U/L (ref 1–40)
BASOPHILS # BLD AUTO: 0.03 10*3/MM3 (ref 0–0.2)
BASOPHILS NFR BLD AUTO: 0.4 % (ref 0–1.5)
BILIRUB SERPL-MCNC: 0.3 MG/DL (ref 0–1.2)
BUN SERPL-MCNC: 11 MG/DL (ref 8–23)
BUN/CREAT SERPL: 12.9 (ref 7–25)
CALCIUM SPEC-SCNC: 8.4 MG/DL (ref 8.6–10.5)
CHLORIDE SERPL-SCNC: 109 MMOL/L (ref 98–107)
CO2 SERPL-SCNC: 19.7 MMOL/L (ref 22–29)
CREAT SERPL-MCNC: 0.85 MG/DL (ref 0.76–1.27)
CRP SERPL-MCNC: 3.05 MG/DL (ref 0–0.5)
DEPRECATED RDW RBC AUTO: 39.2 FL (ref 37–54)
EGFRCR SERPLBLD CKD-EPI 2021: 98.2 ML/MIN/1.73
EOSINOPHIL # BLD AUTO: 0.39 10*3/MM3 (ref 0–0.4)
EOSINOPHIL NFR BLD AUTO: 5.3 % (ref 0.3–6.2)
ERYTHROCYTE [DISTWIDTH] IN BLOOD BY AUTOMATED COUNT: 13.1 % (ref 12.3–15.4)
GLOBULIN UR ELPH-MCNC: 2.3 GM/DL
GLUCOSE BLDC GLUCOMTR-MCNC: 127 MG/DL (ref 70–130)
GLUCOSE BLDC GLUCOMTR-MCNC: 165 MG/DL (ref 70–130)
GLUCOSE BLDC GLUCOMTR-MCNC: 222 MG/DL (ref 70–130)
GLUCOSE BLDC GLUCOMTR-MCNC: 247 MG/DL (ref 70–130)
GLUCOSE BLDC GLUCOMTR-MCNC: 92 MG/DL (ref 70–130)
GLUCOSE SERPL-MCNC: 156 MG/DL (ref 65–99)
HBA1C MFR BLD: 8.8 % (ref 4.8–5.6)
HCT VFR BLD AUTO: 42.6 % (ref 37.5–51)
HGB BLD-MCNC: 14.1 G/DL (ref 13–17.7)
IMM GRANULOCYTES # BLD AUTO: 0.05 10*3/MM3 (ref 0–0.05)
IMM GRANULOCYTES NFR BLD AUTO: 0.7 % (ref 0–0.5)
LYMPHOCYTES # BLD AUTO: 2.19 10*3/MM3 (ref 0.7–3.1)
LYMPHOCYTES NFR BLD AUTO: 29.7 % (ref 19.6–45.3)
MCH RBC QN AUTO: 27.3 PG (ref 26.6–33)
MCHC RBC AUTO-ENTMCNC: 33.1 G/DL (ref 31.5–35.7)
MCV RBC AUTO: 82.4 FL (ref 79–97)
MONOCYTES # BLD AUTO: 0.52 10*3/MM3 (ref 0.1–0.9)
MONOCYTES NFR BLD AUTO: 7 % (ref 5–12)
NEUTROPHILS NFR BLD AUTO: 4.2 10*3/MM3 (ref 1.7–7)
NEUTROPHILS NFR BLD AUTO: 56.9 % (ref 42.7–76)
NRBC BLD AUTO-RTO: 0 /100 WBC (ref 0–0.2)
PLATELET # BLD AUTO: 167 10*3/MM3 (ref 140–450)
PMV BLD AUTO: 9.6 FL (ref 6–12)
POTASSIUM SERPL-SCNC: 3.5 MMOL/L (ref 3.5–5.2)
POTASSIUM SERPL-SCNC: 4.2 MMOL/L (ref 3.5–5.2)
PROT SERPL-MCNC: 5.9 G/DL (ref 6–8.5)
RBC # BLD AUTO: 5.17 10*6/MM3 (ref 4.14–5.8)
SODIUM SERPL-SCNC: 140 MMOL/L (ref 136–145)
WBC NRBC COR # BLD AUTO: 7.38 10*3/MM3 (ref 3.4–10.8)

## 2023-12-02 PROCEDURE — 86140 C-REACTIVE PROTEIN: CPT | Performed by: INTERNAL MEDICINE

## 2023-12-02 PROCEDURE — 63710000001 INSULIN LISPRO (HUMAN) PER 5 UNITS: Performed by: INTERNAL MEDICINE

## 2023-12-02 PROCEDURE — 84132 ASSAY OF SERUM POTASSIUM: CPT | Performed by: INTERNAL MEDICINE

## 2023-12-02 PROCEDURE — 83036 HEMOGLOBIN GLYCOSYLATED A1C: CPT | Performed by: INTERNAL MEDICINE

## 2023-12-02 PROCEDURE — 96372 THER/PROPH/DIAG INJ SC/IM: CPT

## 2023-12-02 PROCEDURE — 63710000001 INSULIN GLARGINE PER 5 UNITS: Performed by: INTERNAL MEDICINE

## 2023-12-02 PROCEDURE — 25010000002 ENOXAPARIN PER 10 MG: Performed by: INTERNAL MEDICINE

## 2023-12-02 PROCEDURE — 96361 HYDRATE IV INFUSION ADD-ON: CPT

## 2023-12-02 PROCEDURE — 25810000003 SODIUM CHLORIDE 0.9 % SOLUTION: Performed by: INTERNAL MEDICINE

## 2023-12-02 PROCEDURE — 85025 COMPLETE CBC W/AUTO DIFF WBC: CPT | Performed by: INTERNAL MEDICINE

## 2023-12-02 PROCEDURE — 80053 COMPREHEN METABOLIC PANEL: CPT | Performed by: INTERNAL MEDICINE

## 2023-12-02 PROCEDURE — G0378 HOSPITAL OBSERVATION PER HR: HCPCS

## 2023-12-02 PROCEDURE — 82948 REAGENT STRIP/BLOOD GLUCOSE: CPT

## 2023-12-02 PROCEDURE — 94640 AIRWAY INHALATION TREATMENT: CPT

## 2023-12-02 RX ORDER — POTASSIUM CHLORIDE 750 MG/1
40 TABLET, FILM COATED, EXTENDED RELEASE ORAL EVERY 4 HOURS
Status: COMPLETED | OUTPATIENT
Start: 2023-12-02 | End: 2023-12-02

## 2023-12-02 RX ORDER — BUDESONIDE AND FORMOTEROL FUMARATE DIHYDRATE 160; 4.5 UG/1; UG/1
2 AEROSOL RESPIRATORY (INHALATION)
Status: DISCONTINUED | OUTPATIENT
Start: 2023-12-02 | End: 2023-12-03 | Stop reason: HOSPADM

## 2023-12-02 RX ADMIN — CETIRIZINE HYDROCHLORIDE 10 MG: 10 TABLET ORAL at 08:39

## 2023-12-02 RX ADMIN — PANTOPRAZOLE SODIUM 40 MG: 40 TABLET, DELAYED RELEASE ORAL at 07:02

## 2023-12-02 RX ADMIN — POTASSIUM CHLORIDE 40 MEQ: 750 TABLET, EXTENDED RELEASE ORAL at 17:04

## 2023-12-02 RX ADMIN — POTASSIUM CHLORIDE 40 MEQ: 750 TABLET, EXTENDED RELEASE ORAL at 11:51

## 2023-12-02 RX ADMIN — Medication 10 ML: at 21:03

## 2023-12-02 RX ADMIN — GLIPIZIDE 10 MG: 10 TABLET ORAL at 07:02

## 2023-12-02 RX ADMIN — INSULIN GLARGINE 25 UNITS: 100 INJECTION, SOLUTION SUBCUTANEOUS at 21:03

## 2023-12-02 RX ADMIN — GLIPIZIDE 10 MG: 10 TABLET ORAL at 17:25

## 2023-12-02 RX ADMIN — SUCRALFATE 1 G: 1 TABLET ORAL at 11:51

## 2023-12-02 RX ADMIN — Medication 10 ML: at 08:48

## 2023-12-02 RX ADMIN — SUCRALFATE 1 G: 1 TABLET ORAL at 07:02

## 2023-12-02 RX ADMIN — ATORVASTATIN CALCIUM 40 MG: 20 TABLET, FILM COATED ORAL at 08:39

## 2023-12-02 RX ADMIN — QUETIAPINE FUMARATE 100 MG: 100 TABLET ORAL at 08:39

## 2023-12-02 RX ADMIN — SUCRALFATE 1 G: 1 TABLET ORAL at 17:04

## 2023-12-02 RX ADMIN — SODIUM CHLORIDE 100 ML/HR: 9 INJECTION, SOLUTION INTRAVENOUS at 03:55

## 2023-12-02 RX ADMIN — ENOXAPARIN SODIUM 40 MG: 100 INJECTION SUBCUTANEOUS at 08:39

## 2023-12-02 RX ADMIN — BUDESONIDE AND FORMOTEROL FUMARATE DIHYDRATE 2 PUFF: 160; 4.5 AEROSOL RESPIRATORY (INHALATION) at 19:49

## 2023-12-02 RX ADMIN — SODIUM CHLORIDE 100 ML/HR: 9 INJECTION, SOLUTION INTRAVENOUS at 23:11

## 2023-12-02 RX ADMIN — PREGABALIN 300 MG: 100 CAPSULE ORAL at 21:24

## 2023-12-02 RX ADMIN — INSULIN LISPRO 5 UNITS: 100 INJECTION, SOLUTION INTRAVENOUS; SUBCUTANEOUS at 08:39

## 2023-12-02 RX ADMIN — QUETIAPINE FUMARATE 100 MG: 100 TABLET ORAL at 21:03

## 2023-12-02 RX ADMIN — INSULIN LISPRO 4 UNITS: 100 INJECTION, SOLUTION INTRAVENOUS; SUBCUTANEOUS at 17:05

## 2023-12-02 RX ADMIN — PREGABALIN 300 MG: 100 CAPSULE ORAL at 08:39

## 2023-12-02 RX ADMIN — INSULIN LISPRO 5 UNITS: 100 INJECTION, SOLUTION INTRAVENOUS; SUBCUTANEOUS at 17:04

## 2023-12-02 NOTE — PROGRESS NOTES
Name: Saul Berrios ADMIT: 2023   : 1960  PCP: Betsy Vega APRN    MRN: [id number] LOS: 0 days   AGE/SEX: 63 y.o. male  ROOM: [facility]     Subjective   Subjective   History obtained via family because of language barrier.  Patient feels better with decreased weakness and malaise.  No chest pain.  No fever or chills.  No palpitation.  No shortness of breath.  No cough.  No wheeze.  No hemoptysis.    Review of Systems  GI.  No abdominal pain or nausea or vomiting.  .  No dysuria or hematuria.     Objective   Objective   Vital Signs  Temp:  [97.2 °F (36.2 °C)-98.6 °F (37 °C)] 98.2 °F (36.8 °C)  Heart Rate:  [] 94  Resp:  [18-20] 18  BP: ()/(64-87) 92/64  SpO2:  [95 %-99 %] 99 %  on   ;   Device (Oxygen Therapy): room air  No intake or output data in the 24 hours ending 23 1313  Body mass index is 28.35 kg/m².      23  1800   Weight: 72.6 kg (160 lb)     Physical Exam  General.  Middle-aged gentleman.  Alert and oriented x 3.  In no apparent pain/distress/diaphoresis.  Normal mood and affect.  Eyes.  Pupils equal round and reactive.  Intact extraocular musculature.  No jaundice.  Oral cavity.  Moist mucous membrane.  Neck.  Supple.  No JVD.  No lymphadenopathy or thyromegaly.  Cardiovascular.  Regular rate and rhythm with no gallops or murmurs.  Chest.  Bilateral air entry with rhonchi on the left lung base and scattered bilateral expiratory wheeze.  Abdomen.  Soft lax.  No tenderness.  No organomegaly.  No guarding or rebound.  Extremities no clubbing/cyanosis/edema.  CNS.  No acute focal neurological deficits.      Results Review:      Results from last 7 days   Lab Units 23  0417 23  1248   SODIUM mmol/L 140 137   POTASSIUM mmol/L 3.5 4.3   CHLORIDE mmol/L 109* 106   CO2 mmol/L 19.7* 21.0*   BUN mg/dL 11 16   CREATININE mg/dL 0.85 0.99   GLUCOSE mg/dL 156* 327*   CALCIUM mg/dL 8.4* 8.9   AST (SGOT) U/L 22 24   ALT (SGPT) U/L 24 25     Estimated Creatinine Clearance:  "80.5 mL/min (by C-G formula based on SCr of 0.85 mg/dL).  Results from last 7 days   Lab Units 12/02/23  0417   HEMOGLOBIN A1C % 8.80*     Results from last 7 days   Lab Units 12/02/23  1143 12/02/23  0747 12/01/23  2117 12/01/23  1811   GLUCOSE mg/dL 92 127 191* 122     Results from last 7 days   Lab Units 12/01/23  1248   HSTROP T ng/L 9         Results from last 7 days   Lab Units 12/01/23  1248   TSH uIU/mL 0.330     Results from last 7 days   Lab Units 12/01/23  1248   MAGNESIUM mg/dL 2.0           Invalid input(s): \"LDLCALC\"  Results from last 7 days   Lab Units 12/02/23  0417 12/01/23  1248   WBC 10*3/mm3 7.38 9.93   HEMOGLOBIN g/dL 14.1 15.5   HEMATOCRIT % 42.6 46.7   PLATELETS 10*3/mm3 167 132*   MCV fL 82.4 84.6   MCH pg 27.3 28.1   MCHC g/dL 33.1 33.2   RDW % 13.1 13.5   RDW-SD fl 39.2 41.7   MPV fL 9.6 10.5   NEUTROPHIL % % 56.9 77.4*   LYMPHOCYTE % % 29.7 14.7*   MONOCYTES % % 7.0 4.8*   EOSINOPHIL % % 5.3 2.2   BASOPHIL % % 0.4 0.4   IMM GRAN % % 0.7* 0.5   NEUTROS ABS 10*3/mm3 4.20 7.68*   LYMPHS ABS 10*3/mm3 2.19 1.46   MONOS ABS 10*3/mm3 0.52 0.48   EOS ABS 10*3/mm3 0.39 0.22   BASOS ABS 10*3/mm3 0.03 0.04   IMMATURE GRANS (ABS) 10*3/mm3 0.05 0.05   NRBC /100 WBC 0.0 0.0             Results from last 7 days   Lab Units 12/01/23  1248   PROCALCITONIN ng/mL 0.08                 Results from last 7 days   Lab Units 12/01/23  1300   ADENOVIRUS DETECTION BY PCR  Not Detected   CORONAVIRUS 229E  Not Detected   CORONAVIRUS HKU1  Not Detected   CORONAVIRUS NL63  Not Detected   CORONAVIRUS OC43  Not Detected   HUMAN METAPNEUMOVIRUS  Not Detected   HUMAN RHINOVIRUS/ENTEROVIRUS  Not Detected   INFLUENZA B PCR  Not Detected   PARAINFLUENZA 1  Not Detected   PARAINFLUENZA VIRUS 2  Not Detected   PARAINFLUENZA VIRUS 3  Not Detected   PARAINFLUENZA VIRUS 4  Not Detected   BORDETELLA PERTUSSIS PCR  Not Detected   CHLAMYDOPHILA PNEUMONIAE PCR  Not Detected   MYCOPLAMA PNEUMO PCR  Not Detected   INFLUENZA A PCR  Not " Detected   RSV, PCR  Not Detected               Imaging:  Imaging Results (Last 24 Hours)       ** No results found for the last 24 hours. **               I reviewed the patient's new clinical results / labs / tests / procedures      Assessment/Plan     Active Hospital Problems    Diagnosis  POA    **Chest pain [R07.9]  Yes    DM (diabetes mellitus), type 2 [E11.9]  Yes    HTN (hypertension) [I10]  Yes    Thrombocytopenia [D69.6]  Yes      Resolved Hospital Problems   No resolved problems to display.           Weakness and fatigue secondary to COVID-19 infection patient with a history of asthma..  No hypoxemia.  Negative respiratory PCR panel except COVID-19.  Chest x-ray without infiltrate negative procalcitonin.  No need for steroids.  Symptomatology more than 7 days 7 days and he was not started on anti-COVID medications.  Initiate Symbicort.  Stable.    Type 2 diabetes.  Continue insulin and sliding scale.  A1c is 8.8.  Will need adjustment of his regimen as an outpatient.    Hypertension.  Blood pressure is on the low side.  Jardiance on hold.  No angina or congestive heart failure.  Hyperlipidemia.  Continue Lipitor.  VTE prophylaxis.  Lovenox.      Discussed my findings and plan of treatment with the patient/family.  Disposition.  Hopefully home tomorrow if continues to improve.        Ant Camacho MD  [facility]  12/02/23  13:13 EST

## 2023-12-03 VITALS
BODY MASS INDEX: 28.35 KG/M2 | HEIGHT: 63 IN | HEART RATE: 80 BPM | RESPIRATION RATE: 16 BRPM | DIASTOLIC BLOOD PRESSURE: 78 MMHG | OXYGEN SATURATION: 96 % | WEIGHT: 160 LBS | TEMPERATURE: 97.8 F | SYSTOLIC BLOOD PRESSURE: 108 MMHG

## 2023-12-03 PROBLEM — R07.9 CHEST PAIN: Status: RESOLVED | Noted: 2023-12-01 | Resolved: 2023-12-03

## 2023-12-03 PROBLEM — D89.831 CYTOKINE RELEASE SYNDROME, GRADE 1: Status: ACTIVE | Noted: 2023-12-03

## 2023-12-03 PROBLEM — U07.1 COVID-19 VIRUS INFECTION: Status: ACTIVE | Noted: 2023-12-03

## 2023-12-03 PROBLEM — D69.6 THROMBOCYTOPENIA: Status: RESOLVED | Noted: 2022-03-26 | Resolved: 2023-12-03

## 2023-12-03 LAB
ALBUMIN SERPL-MCNC: 3.8 G/DL (ref 3.5–5.2)
ALBUMIN/GLOB SERPL: 1.5 G/DL
ALP SERPL-CCNC: 83 U/L (ref 39–117)
ALT SERPL W P-5'-P-CCNC: 24 U/L (ref 1–41)
ANION GAP SERPL CALCULATED.3IONS-SCNC: 9.1 MMOL/L (ref 5–15)
AST SERPL-CCNC: 18 U/L (ref 1–40)
BASOPHILS # BLD AUTO: 0.04 10*3/MM3 (ref 0–0.2)
BASOPHILS NFR BLD AUTO: 0.8 % (ref 0–1.5)
BILIRUB SERPL-MCNC: 0.3 MG/DL (ref 0–1.2)
BUN SERPL-MCNC: 9 MG/DL (ref 8–23)
BUN/CREAT SERPL: 11.1 (ref 7–25)
CALCIUM SPEC-SCNC: 9.2 MG/DL (ref 8.6–10.5)
CHLORIDE SERPL-SCNC: 110 MMOL/L (ref 98–107)
CO2 SERPL-SCNC: 22.9 MMOL/L (ref 22–29)
CREAT SERPL-MCNC: 0.81 MG/DL (ref 0.76–1.27)
CRP SERPL-MCNC: 2.01 MG/DL (ref 0–0.5)
DEPRECATED RDW RBC AUTO: 39.3 FL (ref 37–54)
EGFRCR SERPLBLD CKD-EPI 2021: 99.7 ML/MIN/1.73
EOSINOPHIL # BLD AUTO: 0.33 10*3/MM3 (ref 0–0.4)
EOSINOPHIL NFR BLD AUTO: 6.4 % (ref 0.3–6.2)
ERYTHROCYTE [DISTWIDTH] IN BLOOD BY AUTOMATED COUNT: 13.3 % (ref 12.3–15.4)
GLOBULIN UR ELPH-MCNC: 2.5 GM/DL
GLUCOSE BLDC GLUCOMTR-MCNC: 117 MG/DL (ref 70–130)
GLUCOSE SERPL-MCNC: 132 MG/DL (ref 65–99)
HCT VFR BLD AUTO: 42.6 % (ref 37.5–51)
HGB BLD-MCNC: 13.9 G/DL (ref 13–17.7)
IMM GRANULOCYTES # BLD AUTO: 0.03 10*3/MM3 (ref 0–0.05)
IMM GRANULOCYTES NFR BLD AUTO: 0.6 % (ref 0–0.5)
LYMPHOCYTES # BLD AUTO: 1.91 10*3/MM3 (ref 0.7–3.1)
LYMPHOCYTES NFR BLD AUTO: 37.2 % (ref 19.6–45.3)
MCH RBC QN AUTO: 26.8 PG (ref 26.6–33)
MCHC RBC AUTO-ENTMCNC: 32.6 G/DL (ref 31.5–35.7)
MCV RBC AUTO: 82.2 FL (ref 79–97)
MONOCYTES # BLD AUTO: 0.36 10*3/MM3 (ref 0.1–0.9)
MONOCYTES NFR BLD AUTO: 7 % (ref 5–12)
NEUTROPHILS NFR BLD AUTO: 2.47 10*3/MM3 (ref 1.7–7)
NEUTROPHILS NFR BLD AUTO: 48 % (ref 42.7–76)
NRBC BLD AUTO-RTO: 0 /100 WBC (ref 0–0.2)
PLATELET # BLD AUTO: 166 10*3/MM3 (ref 140–450)
PMV BLD AUTO: 9.4 FL (ref 6–12)
POTASSIUM SERPL-SCNC: 4 MMOL/L (ref 3.5–5.2)
PROT SERPL-MCNC: 6.3 G/DL (ref 6–8.5)
RBC # BLD AUTO: 5.18 10*6/MM3 (ref 4.14–5.8)
SODIUM SERPL-SCNC: 142 MMOL/L (ref 136–145)
WBC NRBC COR # BLD AUTO: 5.14 10*3/MM3 (ref 3.4–10.8)

## 2023-12-03 PROCEDURE — 86140 C-REACTIVE PROTEIN: CPT | Performed by: INTERNAL MEDICINE

## 2023-12-03 PROCEDURE — 82948 REAGENT STRIP/BLOOD GLUCOSE: CPT

## 2023-12-03 PROCEDURE — G0378 HOSPITAL OBSERVATION PER HR: HCPCS

## 2023-12-03 PROCEDURE — 94799 UNLISTED PULMONARY SVC/PX: CPT

## 2023-12-03 PROCEDURE — 94664 DEMO&/EVAL PT USE INHALER: CPT

## 2023-12-03 PROCEDURE — 25010000002 ENOXAPARIN PER 10 MG: Performed by: INTERNAL MEDICINE

## 2023-12-03 PROCEDURE — 63710000001 INSULIN LISPRO (HUMAN) PER 5 UNITS: Performed by: INTERNAL MEDICINE

## 2023-12-03 PROCEDURE — 96372 THER/PROPH/DIAG INJ SC/IM: CPT

## 2023-12-03 PROCEDURE — 94761 N-INVAS EAR/PLS OXIMETRY MLT: CPT

## 2023-12-03 PROCEDURE — 80053 COMPREHEN METABOLIC PANEL: CPT | Performed by: INTERNAL MEDICINE

## 2023-12-03 PROCEDURE — 85025 COMPLETE CBC W/AUTO DIFF WBC: CPT | Performed by: INTERNAL MEDICINE

## 2023-12-03 RX ADMIN — QUETIAPINE FUMARATE 100 MG: 100 TABLET ORAL at 08:00

## 2023-12-03 RX ADMIN — ENOXAPARIN SODIUM 40 MG: 100 INJECTION SUBCUTANEOUS at 08:00

## 2023-12-03 RX ADMIN — CETIRIZINE HYDROCHLORIDE 10 MG: 10 TABLET ORAL at 08:00

## 2023-12-03 RX ADMIN — PANTOPRAZOLE SODIUM 40 MG: 40 TABLET, DELAYED RELEASE ORAL at 07:07

## 2023-12-03 RX ADMIN — INSULIN LISPRO 5 UNITS: 100 INJECTION, SOLUTION INTRAVENOUS; SUBCUTANEOUS at 08:00

## 2023-12-03 RX ADMIN — DOCUSATE SODIUM 50MG AND SENNOSIDES 8.6MG 2 TABLET: 8.6; 5 TABLET, FILM COATED ORAL at 08:01

## 2023-12-03 RX ADMIN — SUCRALFATE 1 G: 1 TABLET ORAL at 07:07

## 2023-12-03 RX ADMIN — ATORVASTATIN CALCIUM 40 MG: 20 TABLET, FILM COATED ORAL at 08:00

## 2023-12-03 RX ADMIN — GLIPIZIDE 10 MG: 10 TABLET ORAL at 07:07

## 2023-12-03 RX ADMIN — BUDESONIDE AND FORMOTEROL FUMARATE DIHYDRATE 2 PUFF: 160; 4.5 AEROSOL RESPIRATORY (INHALATION) at 07:27

## 2023-12-03 RX ADMIN — Medication 10 ML: at 08:01

## 2023-12-03 NOTE — DISCHARGE SUMMARY
Patient Name: Saul Berrios  : 1960  MRN: [id number]    Date of Admission: 2023  Date of Discharge:  12/3/2023  Primary Care Physician: eBtsy Vega APRN      Discharge Diagnoses     Active Hospital Problems    Diagnosis  POA    **COVID-19 virus infection [U07.1]  Yes    Cytokine release syndrome, grade 1 [D89.831]  No    DM (diabetes mellitus), type 2 [E11.9]  Yes    HTN (hypertension) [I10]  Yes      Resolved Hospital Problems    Diagnosis Date Resolved POA    Chest pain [R07.9] 2023 Yes    Thrombocytopenia [D69.6] 2023 Yes        Hospital Course     Brief admission history and physical.  Please refer to the H&P for full details.  A pleasant 63 years old [place] gentleman with a past history of type 2 diabetes/asthma/hypertension/dyslipidemia who was recently diagnosed with pneumonia and finished a course of antibiotic who presented to the emergency department with atypical chest pain and weakness.  Positive fever and dyspnea and palpitation.  Physical examination on admission revealed a blood pressure of 106/83 a pulse of 91 respiratory rate of 28 temperature 97.1 O2 sats of 99% room air.  Acutely ill-appearing.  Rhonchi left lung base.  Hospital course.  Initial ER evaluation included D-dimer was negative.  Procalcitonin was negative.  CBC was normal except a platelets of 132.  TSH was normal.  Exam was normal.  Troponin was negative.  CMP normal except random blood sugar of 327 and CO2 of 21.  Respiratory PCR panel was positive for COVID-19.  A1c was 8.8.  Chest x-ray without acute disease.  EKG with sinus tachycardia.  Was admitted with weakness and fatigue associated with dyspnea and atypical chest pain secondary to COVID-19 pneumonia in a patient with a history of asthma.  Chest x-ray was negative with no evidence of hypoxemia.  His symptomatology has been more than 7 days and no antiviral treatment was initiated.  He was maintained on his bronchodilator.  Respiratory status remained  stable and his weakness and fatigue has improved.  CRP has been trending down during this hospitalization.  At the time of discharge there was no chest pain/no shortness of breath/no cough with minimal weakness.  As far as his type 2 diabetes A1c was 8.8 and I will leave it to his primary care physician to adjust his diabetic medications.  He was placed on long-acting insulin and sliding scale while he is in the hospital and at the time of discharge his oral hypoglycemic/Trulicity/long-acting insulin was resumed.  He did does have a history of hypertension blood pressure was on the low side there was no evidence of angina or congestive heart failure and he is not on any hyper extensive medications.  For his dyslipidemia his Lipitor was maintained.  During the hospitalization he remained on VTE prophylaxis with Lovenox.  At the time of discharge he was hemodynamically stable with no symptoms and tolerating p.o. well.  Follow-up with primary MD in 1 week.  Consultants     Consult Orders (all) (From admission, onward)       Start     Ordered    12/01/23 1619  Inpatient Diabetes Educator Consult  Once        Provider:  (Not yet assigned)    12/01/23 1618    12/01/23 1618  Inpatient Consult to Advance Care Planning  Once        Provider:  (Not yet assigned)    12/01/23 1617    12/01/23 1436  [facility] (on-call MD unless specified) Details  Once        Specialty:  Hospitalist  Provider:  (Not yet assigned)    12/01/23 1435                  Procedures     Imaging Results (All)       Procedure Component Value Units Date/Time    XR Chest 1 View [[id number]] Collected: 12/01/23 1232     Updated: 12/01/23 1236    Narrative:      XR CHEST 1 VW-12/1/2023     HISTORY: Dysrhythmia.     Heart size is within normal limits. Lungs are slightly underinflated but  appear clear. Bones and soft tissues are unremarkable.       Impression:      1. No acute process.        This report was finalized on 12/1/2023 12:32 PM by Dr. Rivas  "YUMIKO Llanos on Workstation: [id number]               Pertinent Labs     Results from last 7 days   Lab Units 12/03/23  0555 12/02/23 0417 12/01/23  1248   WBC 10*3/mm3 5.14 7.38 9.93   HEMOGLOBIN g/dL 13.9 14.1 15.5   PLATELETS 10*3/mm3 166 167 132*     Results from last 7 days   Lab Units 12/03/23  0555 12/02/23 1955 12/02/23 0417 12/01/23  1248   SODIUM mmol/L 142  --  140 137   POTASSIUM mmol/L 4.0 4.2 3.5 4.3   CHLORIDE mmol/L 110*  --  109* 106   CO2 mmol/L 22.9  --  19.7* 21.0*   BUN mg/dL 9  --  11 16   CREATININE mg/dL 0.81  --  0.85 0.99   GLUCOSE mg/dL 132*  --  156* 327*   Estimated Creatinine Clearance: 84.5 mL/min (by C-G formula based on SCr of 0.81 mg/dL).  Results from last 7 days   Lab Units 12/03/23  0555 12/02/23 0417 12/01/23  1248   ALBUMIN g/dL 3.8 3.6 4.5   BILIRUBIN mg/dL 0.3 0.3 0.5   ALK PHOS U/L 83 89 101   AST (SGOT) U/L 18 22 24   ALT (SGPT) U/L 24 24 25     Results from last 7 days   Lab Units 12/03/23  0555 12/02/23 0417 12/01/23  1248   CALCIUM mg/dL 9.2 8.4* 8.9   ALBUMIN g/dL 3.8 3.6 4.5   MAGNESIUM mg/dL  --   --  2.0       Results from last 7 days   Lab Units 12/01/23  1248   HSTROP T ng/L 9   D DIMER QUANT MCGFEU/mL <0.27           Invalid input(s): \"LDLCALC\"      Imaging Results (Last 24 Hours)       ** No results found for the last 24 hours. **            Test Results Pending at Discharge         Discharge Exam   Physical Exam  Vitals.  Temperature 97.8 a pulse of 80 respirate rate of 16 and blood pressure 108/78 and O2 sats of 96% on room air  General.  Middle-aged gentleman.  Alert and oriented x 3.  In no apparent pain/distress/diaphoresis.  Normal mood and affect.  Eyes.  Pupils equal round and reactive.  Intact extraocular musculature.  No jaundice.  Oral cavity.  Moist mucous membrane.  Neck.  Supple.  No JVD.  No lymphadenopathy or thyromegaly.  Cardiovascular.  Regular rate and rhythm with no gallops or murmurs.  Chest.  Bilateral air entry with no added sounds " (resolved wheeze and rhonchi) abdomen.  Soft lax.  No tenderness.  No organomegaly.  No guarding or rebound.  Extremities no clubbing/cyanosis/edema.  CNS.  No acute focal neurological deficits.  Discharge Details        Discharge Medications        Changes to Medications        Instructions Start Date   Trulicity 1.5 MG/0.5ML solution pen-injector  Generic drug: Dulaglutide  What changed: Another medication with the same name was removed. Continue taking this medication, and follow the directions you see here.   Subcutaneous             Continue These Medications        Instructions Start Date   acetaminophen 325 MG tablet  Commonly known as: TYLENOL   650 mg, Oral, Every 6 Hours PRN      atorvastatin 40 MG tablet  Commonly known as: LIPITOR   40 mg, Oral, Daily      cetirizine 10 MG tablet  Commonly known as: zyrTEC   10 mg, Oral, Daily      glipizide 10 MG tablet  Commonly known as: GLUCOTROL   10 mg, Oral, 2 Times Daily Before Meals, Take 2 tabs daily      JARDIANCE PO   Oral      nateglinide 60 MG tablet  Commonly known as: STARLIX   60 mg, Oral, 3 Times Daily Before Meals      omeprazole 40 MG capsule  Commonly known as: priLOSEC   40 mg, Oral, Daily      pregabalin 100 MG capsule  Commonly known as: LYRICA   300 mg, Oral, 2 Times Daily      QUEtiapine 100 MG tablet  Commonly known as: SEROquel   100 mg, Oral, 2 Times Daily, bid      Stiolto Respimat 2.5-2.5 MCG/ACT aerosol solution inhaler  Generic drug: tiotropium bromide-olodaterol   No dose, route, or frequency recorded.      sucralfate 1 GM/10ML suspension  Commonly known as: Carafate   1 g, Oral, 4 Times Daily      Tresiba FlexTouch 100 UNIT/ML solution pen-injector injection  Generic drug: insulin degludec   Subcutaneous      Ventolin  (90 Base) MCG/ACT inhaler  Generic drug: albuterol sulfate HFA   No dose, route, or frequency recorded.             Stop These Medications      Soliqua 100-33 UNT-MCG/ML solution pen-injector injection  Generic  drug: Insulin Glargine-Lixisenatide              No Known Allergies      Discharge Disposition:  Condition: Stable    Diet:   Diet Order   Procedures    Diet: Cardiac Diets, Diabetic Diets, Renal Diets; Healthy Heart (2-3 Na+); Consistent Carbohydrate; Low Sodium (2-3g), Low Potassium, Low Phosphorus; Texture: Regular Texture (IDDSI 7); Fluid Consistency: Thin (IDDSI 0)       Activity:   Activity Instructions       Activity as Tolerated              Counseling : Self quarantine x 7 days    CODE STATUS:    Code Status and Medical Interventions:   Ordered at: 12/01/23 1497     Code Status (Patient has no pulse and is not breathing):    CPR (Attempt to Resuscitate)     Medical Interventions (Patient has pulse or is breathing):    Full Support       No future appointments.  Additional Instructions for the Follow-ups that You Need to Schedule       Call MD With Problems / Concerns   As directed      Instructions: Call MD or return to ER if relapse of the weakness/shortness of breath/chest pain/palpitation/cough/nausea or vomiting    Order Comments: Instructions: Call MD or return to ER if relapse of the weakness/shortness of breath/chest pain/palpitation/cough/nausea or vomiting         Discharge Follow-up with PCP   As directed       Currently Documented PCP:    Betsy Vega APRN    PCP Phone Number:    [phone]     Follow Up Details: Primary MD.  1 week.  COVID-19 infection/type 2 diabetes/weakness/hypertension/dyslipidemia               Follow-up Information       Betsy Vega APRN .    Specialty: Nurse Practitioner  Why: Primary MD.  1 week.  COVID-19 infection/type 2 diabetes/weakness/hypertension/dyslipidemia  Contact information:  [address] [place] [address] [address]  [address] [place] [address]  [phone]                               Time Spent on Discharge:  Greater than 30 minutes      Ant Camacho MD  [facility]  12/03/23  10:52 EST

## 2023-12-03 NOTE — PLAN OF CARE
Goal Outcome Evaluation:      Plan of care reviewed with: Patient  Progress: On-going  Shift Evaluation: Patient alert and oriented x 4 on room air. Blood pressures low but stable. Isolation precautions maintained. Patient receiving Lovenox for Prevention and Management of VTE. No complaints of chest pain this shift.

## 2023-12-04 NOTE — PROGRESS NOTES
Discharge Planning Assessment  [facility]     Patient Name: Saul Berrios  MRN: [id number]  Today's Date: 12/4/2023    Admit Date: 12/1/2023    Plan: Home   Discharge Needs Assessment    No documentation.                  Discharge Plan       Row Name 12/04/23 1618       Plan    Plan Home    Final Discharge Disposition Code 01 - home or self-care    Final Note Home                  Continued Care and Services - Discharged on 12/3/2023 Admission date: 12/1/2023 - Discharge disposition: Home or Self Care   Coordination has not been started for this encounter.       Expected Discharge Date and Time       Expected Discharge Date Expected Discharge Time    Dec 3, 2023            Demographic Summary    No documentation.                  Functional Status    No documentation.                  Psychosocial    No documentation.                  Abuse/Neglect    No documentation.                  Legal    No documentation.                  Substance Abuse    No documentation.                  Patient Forms    No documentation.                     Ana Cristina Decker RN

## 2024-01-09 ENCOUNTER — TELEPHONE (OUTPATIENT)
Dept: GASTROENTEROLOGY | Facility: CLINIC | Age: 64
End: 2024-01-09
Payer: MEDICAID

## 2024-01-09 ENCOUNTER — OFFICE VISIT (OUTPATIENT)
Dept: GASTROENTEROLOGY | Facility: CLINIC | Age: 64
End: 2024-01-09
Payer: MEDICAID

## 2024-01-09 VITALS
BODY MASS INDEX: 30.21 KG/M2 | WEIGHT: 160 LBS | TEMPERATURE: 97.8 F | OXYGEN SATURATION: 95 % | SYSTOLIC BLOOD PRESSURE: 110 MMHG | HEIGHT: 61 IN | HEART RATE: 91 BPM | DIASTOLIC BLOOD PRESSURE: 74 MMHG

## 2024-01-09 DIAGNOSIS — K21.9 GASTROESOPHAGEAL REFLUX DISEASE, UNSPECIFIED WHETHER ESOPHAGITIS PRESENT: Primary | ICD-10-CM

## 2024-01-09 DIAGNOSIS — R10.84 GENERALIZED ABDOMINAL PAIN: ICD-10-CM

## 2024-01-09 DIAGNOSIS — R10.12 LEFT UPPER QUADRANT ABDOMINAL PAIN: ICD-10-CM

## 2024-01-09 PROCEDURE — 1159F MED LIST DOCD IN RCRD: CPT | Performed by: INTERNAL MEDICINE

## 2024-01-09 PROCEDURE — 3074F SYST BP LT 130 MM HG: CPT | Performed by: INTERNAL MEDICINE

## 2024-01-09 PROCEDURE — 99213 OFFICE O/P EST LOW 20 MIN: CPT | Performed by: INTERNAL MEDICINE

## 2024-01-09 PROCEDURE — 3078F DIAST BP <80 MM HG: CPT | Performed by: INTERNAL MEDICINE

## 2024-01-09 PROCEDURE — 1160F RVW MEDS BY RX/DR IN RCRD: CPT | Performed by: INTERNAL MEDICINE

## 2024-01-09 RX ORDER — DULOXETIN HYDROCHLORIDE 60 MG/1
60 CAPSULE, DELAYED RELEASE ORAL DAILY
COMMUNITY
Start: 2023-10-26

## 2024-01-09 RX ORDER — LISINOPRIL 2.5 MG/1
2.5 TABLET ORAL DAILY
COMMUNITY
Start: 2023-10-26

## 2024-01-09 RX ORDER — DOXYCYCLINE 100 MG/1
TABLET ORAL
COMMUNITY
Start: 2023-10-26

## 2024-01-09 RX ORDER — BLOOD-GLUCOSE METER
KIT MISCELLANEOUS
COMMUNITY
Start: 2023-12-21

## 2024-01-09 RX ORDER — LANCETS
EACH MISCELLANEOUS
COMMUNITY
Start: 2023-12-12

## 2024-01-09 RX ORDER — BLOOD SUGAR DIAGNOSTIC
STRIP MISCELLANEOUS
COMMUNITY
Start: 2023-12-21

## 2024-01-09 RX ORDER — LORATADINE 10 MG/1
10 TABLET ORAL DAILY
COMMUNITY

## 2024-01-09 RX ORDER — FLURBIPROFEN SODIUM 0.3 MG/ML
SOLUTION/ DROPS OPHTHALMIC
COMMUNITY
Start: 2023-12-04

## 2024-01-09 RX ORDER — PIOGLITAZONEHYDROCHLORIDE 30 MG/1
30 TABLET ORAL DAILY
COMMUNITY
Start: 2023-10-26

## 2024-01-09 RX ORDER — SIMVASTATIN 10 MG
10 TABLET ORAL DAILY
COMMUNITY
Start: 2023-10-26

## 2024-01-09 RX ORDER — PROCHLORPERAZINE 25 MG/1
SUPPOSITORY RECTAL
COMMUNITY
Start: 2023-12-26

## 2024-01-09 RX ORDER — TAMSULOSIN HYDROCHLORIDE 0.4 MG/1
CAPSULE ORAL
COMMUNITY
Start: 2023-12-26

## 2024-01-09 RX ORDER — GLIMEPIRIDE 4 MG/1
8 TABLET ORAL
COMMUNITY
Start: 2023-10-26

## 2024-01-09 NOTE — Clinical Note
Please schedule mesenteric Doppler duplex study in the vascular lab due to abdominal pain in the postprandial setting

## 2024-01-09 NOTE — TELEPHONE ENCOUNTER
----- Message from Martin ROSENTHAL MD sent at 1/9/2024 11:32 AM EST -----  Please schedule mesenteric Doppler duplex study in the vascular lab due to abdominal pain in the postprandial setting

## 2024-01-09 NOTE — PROGRESS NOTES
Chief Complaint   Patient presents with    Abdominal Pain     Left upper quadrant         Saul Berrios is a  64 y.o. male here for a follow up visit for abdominal pain, GERD    HPI this 64-year-old [place] male presents in follow-up since last seen in May 2023.  He continues to complain of left upper quadrant abdominal pain in a postprandial setting.  Usually occurs 5 to 10 minutes after ingestion and last for 5 to 10 minutes.  Does not seem to be associated with specific foods but on further questioning dry foods seem to be a precipitating factor.  Also denies any change in bowel pattern.  Discussed further evaluation since endoscopy had been performed in December 2022.  Will schedule a mesenteric Doppler duplex study to assess possible circulation issues.    Past Medical History:   Diagnosis Date    Arthritis     Diabetes mellitus     Hyperlipidemia        Current Outpatient Medications   Medication Sig Dispense Refill    Accu-Chek FastClix Lancets [facility]       acetaminophen (TYLENOL) 325 MG tablet Take 2 tablets by mouth Every 6 (Six) Hours As Needed for Mild Pain.      atorvastatin (LIPITOR) 40 MG tablet Take 1 tablet by mouth Daily.      B-D UF III MINI PEN NEEDLES 31G X 5 MM misc       Blood Glucose Monitoring Suppl (OneTouch Verio Reflect) w/Device kit       cetirizine (zyrTEC) 10 MG tablet Take 1 tablet by mouth Daily.      Continuous Blood Gluc Sensor (Dexcom G6 Sensor)       doxycycline (ADOXA) 100 MG tablet       Dulaglutide (Trulicity) 1.5 MG/0.5ML solution pen-injector Inject  under the skin into the appropriate area as directed.      DULoxetine (CYMBALTA) 60 MG capsule Take 1 capsule by mouth Daily.      Empagliflozin (JARDIANCE PO) Take  by mouth.      glimepiride (AMARYL) 4 MG tablet Take 2 tablets by mouth.      glipizide (GLUCOTROL) 10 MG tablet Take 1 tablet by mouth 2 (Two) Times a Day Before Meals. Take 2 tabs daily      insulin degludec (Tresiba FlexTouch) 100 UNIT/ML solution pen-injector injection  Inject  under the skin into the appropriate area as directed.      lisinopril (PRINIVIL,ZESTRIL) 2.5 MG tablet Take 1 tablet by mouth Daily.      loratadine (CLARITIN) 10 MG tablet Take 1 tablet by mouth Daily.      nateglinide (STARLIX) 60 MG tablet Take 1 tablet by mouth 3 (Three) Times a Day Before Meals.      omeprazole (priLOSEC) 40 MG capsule Take 1 capsule by mouth Daily.      OneTouch Verio test strip 1 each by Other route 2 (two) times daily.      pioglitazone (ACTOS) 30 MG tablet Take 1 tablet by mouth Daily.      pregabalin (LYRICA) 100 MG capsule Take 3 capsules by mouth 2 (Two) Times a Day.      QUEtiapine (SEROquel) 100 MG tablet Take 1 tablet by mouth 2 (Two) Times a Day. bid      Semaglutide, 1 MG/DOSE, (OZEMPIC) 4 MG/3ML solution pen-injector Inject 1 mg under the skin into the appropriate area as directed Every 7 (Seven) Days.      simvastatin (ZOCOR) 10 MG tablet Take 1 tablet by mouth Daily.      Stiolto Respimat 2.5-2.5 MCG/ACT aerosol solution inhaler       sucralfate (Carafate) 1 GM/10ML suspension Take 10 mL by mouth 4 (Four) Times a Day. 1200 mL 1    tamsulosin (FLOMAX) 0.4 MG capsule 24 hr capsule       Ventolin  (90 Base) MCG/ACT inhaler        No current facility-administered medications for this visit.       PRN Meds:.    No Known Allergies    Social History     Socioeconomic History    Marital status:    Tobacco Use    Smoking status: Some Days     Types: Pipe    Smokeless tobacco: Current     Types: Chew   Vaping Use    Vaping Use: Every day   Substance and Sexual Activity    Alcohol use: Not Currently    Drug use: Not Currently    Sexual activity: Defer       History reviewed. No pertinent family history.    Review of Systems   Constitutional:  Negative for activity change, appetite change, fatigue and unexpected weight change.   HENT:  Negative for congestion, facial swelling, sore throat, trouble swallowing and voice change.    Eyes:  Negative for photophobia and  visual disturbance.   Respiratory:  Negative for cough and choking.    Cardiovascular:  Negative for chest pain.   Gastrointestinal:  Positive for abdominal pain. Negative for abdominal distention, anal bleeding, blood in stool, constipation, diarrhea, nausea, rectal pain and vomiting.   Endocrine: Negative for polyphagia.   Musculoskeletal:  Negative for arthralgias, gait problem and joint swelling.   Skin:  Negative for color change, pallor and rash.   Allergic/Immunologic: Negative for food allergies.   Neurological:  Negative for speech difficulty and headaches.   Hematological:  Does not bruise/bleed easily.   Psychiatric/Behavioral:  Negative for agitation, confusion and sleep disturbance.        Vitals:    01/09/24 1058   BP: 110/74   Pulse: 91   Temp: 97.8 °F (36.6 °C)   SpO2: 95%       Physical Exam  Constitutional:       Appearance: He is well-developed.   HENT:      Head: Normocephalic.   Eyes:      Conjunctiva/sclera: Conjunctivae normal.   Cardiovascular:      Rate and Rhythm: Normal rate and regular rhythm.   Pulmonary:      Breath sounds: Normal breath sounds.   Abdominal:      General: Bowel sounds are normal.      Palpations: Abdomen is soft.   Musculoskeletal:         General: Normal range of motion.      Cervical back: Normal range of motion.   Skin:     General: Skin is warm and dry.   Neurological:      Mental Status: He is alert and oriented to person, place, and time.   Psychiatric:         Behavior: Behavior normal.         ASSESSMENT   #1 upper abdominal pain: Postprandial which may suggest a circulatory issue  #2 GERD      PLAN  Mesenteric Doppler duplex study  Call with results when available      ICD-10-CM ICD-9-CM   1. Gastroesophageal reflux disease, unspecified whether esophagitis present  K21.9 530.81   2. Left upper quadrant abdominal pain  R10.12 789.02

## 2024-02-07 ENCOUNTER — HOSPITAL ENCOUNTER (OUTPATIENT)
Dept: CARDIOLOGY | Facility: HOSPITAL | Age: 64
Discharge: HOME | End: 2024-02-07
Admitting: INTERNAL MEDICINE
Payer: MEDICAID

## 2024-02-07 LAB
BH CV VAS SMA AORTA PSV: 98.1 CM/S
BH CV VAS SMA CELIAC DIST EDV: 31.8 CM/S
BH CV VAS SMA CELIAC DIST PSV: 130 CM/S
BH CV VAS SMA CELIAC ORIGIN EDV: 33.2 CM/S
BH CV VAS SMA CELIAC ORIGIN PSV: 161 CM/S
BH CV VAS SMA CELIAC PROX EDV: 32.9 CM/S
BH CV VAS SMA CELIAC PROX PSV: 130 CM/S
BH CV VAS SMA HEPATIC EDV: 34.1 CM/S
BH CV VAS SMA HEPATIC PSV: 78.9 CM/S
BH CV VAS SMA IMA EDV: 25.7 CM/S
BH CV VAS SMA IMA PSV: 233 CM/S
BH CV VAS SMA ORIGIN EDV: 24.4 CM/S
BH CV VAS SMA ORIGIN PSV: 173 CM/S
BH CV VAS SMA SMA DIST EDV: 18.8 CM/S
BH CV VAS SMA SMA DIST PSV: 151 CM/S
BH CV VAS SMA SMA MID EDV: 19 CM/S
BH CV VAS SMA SMA MID PSV: 164 CM/S
BH CV VAS SMA SMA PROX EDV: 29.1 CM/S
BH CV VAS SMA SMA PROX PSV: 188 CM/S
BH CV VAS SMA SPLENIC EDV: 35.8 CM/S
BH CV VAS SMA SPLENIC PSV: 135 CM/S

## 2024-02-07 PROCEDURE — 93975 VASCULAR STUDY: CPT

## 2024-02-08 ENCOUNTER — TELEPHONE (OUTPATIENT)
Dept: GASTROENTEROLOGY | Facility: CLINIC | Age: 64
End: 2024-02-08
Payer: MEDICAID

## 2024-02-08 NOTE — TELEPHONE ENCOUNTER
----- Message from Martin ROSENTHAL MD sent at 2/8/2024  9:19 AM EST -----  Regarding: Mesenteric Doppler results  Okay to notify patient the mesenteric Doppler study showed normal circulation to the intestinal tract  ----- Message -----  From: Cliff Clark MD  Sent: 2/7/2024  11:23 AM EST  To: Martin ROSENTHAL MD

## 2024-02-08 NOTE — TELEPHONE ENCOUNTER
Called [employer] at [phone] and on  advised of Dr Metcalf's results. Advised to call back with questions.

## (undated) DEVICE — FRCP BX RADJAW4 NDL 2.8 240CM LG OG BX40

## (undated) DEVICE — SENSR O2 OXIMAX FNGR A/ 18IN NONSTR

## (undated) DEVICE — KT ORCA ORCAPOD DISP STRL

## (undated) DEVICE — LN SMPL CO2 SHTRM SD STREAM W/M LUER

## (undated) DEVICE — CANN O2 ETCO2 FITS ALL CONN CO2 SMPL A/ 7IN DISP LF

## (undated) DEVICE — TUBING, SUCTION, 1/4" X 10', STRAIGHT: Brand: MEDLINE

## (undated) DEVICE — BITEBLOCK OMNI BLOC

## (undated) DEVICE — ADAPT CLN BIOGUARD AIR/H2O DISP